# Patient Record
Sex: FEMALE | Race: WHITE | Employment: UNEMPLOYED | URBAN - NONMETROPOLITAN AREA
[De-identification: names, ages, dates, MRNs, and addresses within clinical notes are randomized per-mention and may not be internally consistent; named-entity substitution may affect disease eponyms.]

---

## 2018-02-08 ENCOUNTER — HOSPITAL ENCOUNTER (EMERGENCY)
Age: 43
Discharge: HOME | End: 2018-02-08
Attending: EMERGENCY MEDICINE
Payer: MEDICAID

## 2018-02-08 ENCOUNTER — OFFICE VISIT (OUTPATIENT)
Dept: INTERNAL MEDICINE CLINIC | Age: 43
End: 2018-02-08
Payer: MEDICAID

## 2018-02-08 VITALS
OXYGEN SATURATION: 100 % | BODY MASS INDEX: 19.99 KG/M2 | RESPIRATION RATE: 16 BRPM | HEART RATE: 76 BPM | DIASTOLIC BLOOD PRESSURE: 82 MMHG | WEIGHT: 135 LBS | SYSTOLIC BLOOD PRESSURE: 121 MMHG | HEIGHT: 69 IN | TEMPERATURE: 98.3 F

## 2018-02-08 VITALS
SYSTOLIC BLOOD PRESSURE: 90 MMHG | HEART RATE: 68 BPM | DIASTOLIC BLOOD PRESSURE: 70 MMHG | HEIGHT: 69 IN | BODY MASS INDEX: 19.85 KG/M2 | WEIGHT: 134 LBS

## 2018-02-08 DIAGNOSIS — F45.1 SOMATIC SYMPTOM DISORDER, SEVERE: Primary | ICD-10-CM

## 2018-02-08 DIAGNOSIS — G89.4 CHRONIC PAIN SYNDROME: Primary | ICD-10-CM

## 2018-02-08 DIAGNOSIS — M54.6 ACUTE MIDLINE THORACIC BACK PAIN: ICD-10-CM

## 2018-02-08 PROCEDURE — 99205 OFFICE O/P NEW HI 60 MIN: CPT | Performed by: INTERNAL MEDICINE

## 2018-02-08 PROCEDURE — 99282 EMERGENCY DEPT VISIT SF MDM: CPT

## 2018-02-08 PROCEDURE — G8484 FLU IMMUNIZE NO ADMIN: HCPCS | Performed by: INTERNAL MEDICINE

## 2018-02-08 PROCEDURE — G8420 CALC BMI NORM PARAMETERS: HCPCS | Performed by: INTERNAL MEDICINE

## 2018-02-08 PROCEDURE — 1036F TOBACCO NON-USER: CPT | Performed by: INTERNAL MEDICINE

## 2018-02-08 PROCEDURE — G8427 DOCREV CUR MEDS BY ELIG CLIN: HCPCS | Performed by: INTERNAL MEDICINE

## 2018-02-08 RX ORDER — NORTRIPTYLINE HYDROCHLORIDE 10 MG/1
10 CAPSULE ORAL NIGHTLY
COMMUNITY
End: 2018-09-18 | Stop reason: ALTCHOICE

## 2018-02-08 RX ORDER — KETOROLAC TROMETHAMINE 30 MG/ML
30 INJECTION, SOLUTION INTRAMUSCULAR; INTRAVENOUS ONCE
Status: DISCONTINUED | OUTPATIENT
Start: 2018-02-08 | End: 2018-02-08 | Stop reason: HOSPADM

## 2018-02-08 RX ORDER — ENZYMES,DIGESTIVE
1 CAPSULE ORAL
COMMUNITY
End: 2018-09-18

## 2018-02-08 RX ORDER — MAGNESIUM 30 MG
60 TABLET ORAL NIGHTLY PRN
COMMUNITY
End: 2018-09-27

## 2018-02-08 RX ORDER — ACETAMINOPHEN 160 MG
TABLET,DISINTEGRATING ORAL DAILY PRN
COMMUNITY
End: 2018-09-27

## 2018-02-08 RX ORDER — MELOXICAM 15 MG/1
15 TABLET ORAL DAILY
COMMUNITY
End: 2018-09-18

## 2018-02-08 RX ORDER — VITAMIN E 268 MG
400 CAPSULE ORAL DAILY
COMMUNITY
End: 2018-09-27

## 2018-02-08 RX ORDER — MULTIVIT WITH MINERALS/LUTEIN
1000 TABLET ORAL DAILY
COMMUNITY
End: 2018-09-18

## 2018-02-08 RX ORDER — TRAMADOL HYDROCHLORIDE 50 MG/1
100 TABLET ORAL ONCE
Status: DISCONTINUED | OUTPATIENT
Start: 2018-02-08 | End: 2018-02-08 | Stop reason: HOSPADM

## 2018-02-08 RX ORDER — TRAMADOL HYDROCHLORIDE 50 MG/1
50 TABLET ORAL EVERY 6 HOURS PRN
Qty: 15 TABLET | Refills: 0 | Status: SHIPPED | OUTPATIENT
Start: 2018-02-08 | End: 2018-02-10

## 2018-02-08 RX ORDER — PANCREATIN/LIPASE/PROTEASE/AMY 325 MG
3 TABLET ORAL
COMMUNITY
End: 2018-09-27

## 2018-02-08 ASSESSMENT — PAIN DESCRIPTION - LOCATION: LOCATION: BACK

## 2018-02-08 ASSESSMENT — PATIENT HEALTH QUESTIONNAIRE - PHQ9
SUM OF ALL RESPONSES TO PHQ QUESTIONS 1-9: 0
2. FEELING DOWN, DEPRESSED OR HOPELESS: 0
SUM OF ALL RESPONSES TO PHQ9 QUESTIONS 1 & 2: 0
1. LITTLE INTEREST OR PLEASURE IN DOING THINGS: 0

## 2018-02-08 ASSESSMENT — PAIN DESCRIPTION - ORIENTATION: ORIENTATION: LOWER;MID

## 2018-02-08 ASSESSMENT — PAIN DESCRIPTION - PAIN TYPE: TYPE: ACUTE PAIN

## 2018-02-08 ASSESSMENT — PAIN SCALES - GENERAL: PAINLEVEL_OUTOF10: 7

## 2018-02-08 NOTE — ED PROVIDER NOTES
eMERGENCY dEPARTMENT eNCOUnter      [facility]    Chief Complaint   Patient presents with    Back Pain       HPI    Jacquelin Iglesias is a 38 y.o. female who presents Above-noted complaint. Patient's been having worsening back pain for over 2 years. She actually called the department here asking for a MRI. Obviously cannot give her advice over the phone and she showed up for evaluation. Patient states that she's had chronic back pain for over 2 years. She's been seen by neurologist and rheumatologist and her primary care and she feels like she is getting yanked around. She's been prescribed Mobic and states that she's having problems with that. He's had GI problems and then follow-up with GI. She is frustrated and wants a scan of her body essentially     PAST MEDICAL HISTORY    Past Medical History:   Diagnosis Date    Back pain     chronic for approximately 2 years    Lyme disease 1998       SURGICAL HISTORY    History reviewed. No pertinent surgical history. CURRENT MEDICATIONS    Current Outpatient Rx   Medication Sig Dispense Refill    meloxicam (MOBIC) 15 MG tablet Take 15 mg by mouth daily      traMADol (ULTRAM) 50 MG tablet Take 1 tablet by mouth every 6 hours as needed for Pain for up to 10 doses. 15 tablet 0    nortriptyline (PAMELOR) 10 MG capsule Take 10 mg by mouth nightly         ALLERGIES    Allergies   Allergen Reactions    Pcn [Penicillins] Shortness Of Breath and Rash       FAMILY HISTORY    History reviewed. No pertinent family history.     SOCIAL HISTORY    Social History     Social History    Marital status:      Spouse name: N/A    Number of children: N/A    Years of education: N/A     Social History Main Topics    Smoking status: Never Smoker    Smokeless tobacco: Never Used    Alcohol use No    Drug use: No    Sexual activity: Not Asked     Other Topics Concern    None     Social History Narrative    None       REVIEW OF SYSTEMS    Positive for chronic

## 2018-02-08 NOTE — ED NOTES
Pt refused toradol or tramadol at this time. Time spent with pt explaining importance of following up with physician in the morning. Expressed extreme dismay with care and feeling like no one is listening. Multiple tests have been ran per pt by neurologist, GI and rheumatologist without findings. Possible consult future planned with oncology per other physicians advice. Educated pt on nortryptiline as she has not taken due to she read it is an antidepressent and she is not depressed. Educated her that it is also used for chronic pain and may help her to sleep. Educated on mobic and GI symptoms.   Pt verbalized understanding on med education     Mirtha Vera, RN  02/08/18 1717

## 2018-02-08 NOTE — PROGRESS NOTES
positive for  - fatigue, malaise and sleep disturbance  Psychological ROS: positive for - anxiety  Hematological and Lymphatic ROS: negative  Respiratory ROS: no cough, shortness of breath, or wheezing  Cardiovascular ROS: no chest pain or dyspnea on exertion  Gastrointestinal ROS: positive for - abdominal pain, change in bowel habits, constipation and diarrhea  Genito-Urinary ROS: no dysuria, trouble voiding, or hematuria  Musculoskeletal ROS: positive for - pain in mid back, radiates around his posterior right ribs also goes up to her shoulders  Neurological ROS: positive for - numbness/tingling  Dermatological ROS: negative    Blood pressure 90/70, pulse 68, height 5' 9.02\" (1.753 m), weight 134 lb (60.8 kg), last menstrual period 02/01/2018. Physical Examination: General appearance - alert, well appearing, and in no distress  Mental status - alert, oriented to person, place, and time  Neck - supple, no significant adenopathy  Chest - clear to auscultation, no wheezes, rales or rhonchi, symmetric air entry  Heart - normal rate, regular rhythm, normal S1, S2, no murmurs, rubs, clicks or gallops  Abdomen - soft, nontender, nondistended, no masses or organomegaly  Neurological - alert, oriented, normal speech, no focal findings or movement disorder noted  Musculoskeletal - no joint tenderness, deformity or swelling  Extremities - peripheral pulses normal, no pedal edema, no clubbing or cyanosis  Skin - normal coloration and turgor, no rashes, no suspicious skin lesions noted         1. Somatic symptom disorder, severe     2.  Acute midline thoracic back pain  XR RIBS BILATERAL (3 VIEWS)    XR THORACIC SPINE (2 VIEWS)       Orders Placed This Encounter   Procedures    XR RIBS BILATERAL (3 VIEWS)     Standing Status:   Future     Standing Expiration Date:   2/8/2019     Order Specific Question:   Reason for exam:     Answer:   back pain    XR THORACIC SPINE (2 VIEWS)     Standing Status:   Future     Standing

## 2018-02-08 NOTE — ED NOTES
Discharge teaching and instructions for condition explained to patient. AVS reviewed. Printed prescriptions given to patient. Patient voiced understanding regarding prescriptions, follow up appointments and care of self at home. Pt discharged to home in stable condition per self with spouse at side. x2 doses of tramadol given to pt educated on taking one at a time every 4-6 hours per physicians orders.         Perry Tee RN  02/08/18 9146

## 2018-02-12 ENCOUNTER — OFFICE VISIT (OUTPATIENT)
Dept: INTERNAL MEDICINE CLINIC | Age: 43
End: 2018-02-12
Payer: MEDICAID

## 2018-02-12 VITALS
DIASTOLIC BLOOD PRESSURE: 78 MMHG | HEART RATE: 60 BPM | HEIGHT: 69 IN | BODY MASS INDEX: 19.99 KG/M2 | WEIGHT: 135 LBS | SYSTOLIC BLOOD PRESSURE: 118 MMHG

## 2018-02-12 DIAGNOSIS — F45.1 SOMATIC SYMPTOM DISORDER: Primary | ICD-10-CM

## 2018-02-12 DIAGNOSIS — M54.6 CHRONIC BILATERAL THORACIC BACK PAIN: ICD-10-CM

## 2018-02-12 DIAGNOSIS — K90.41 GLUTEN INTOLERANCE: ICD-10-CM

## 2018-02-12 DIAGNOSIS — G89.29 CHRONIC BILATERAL THORACIC BACK PAIN: ICD-10-CM

## 2018-02-12 PROCEDURE — G8427 DOCREV CUR MEDS BY ELIG CLIN: HCPCS | Performed by: INTERNAL MEDICINE

## 2018-02-12 PROCEDURE — 1036F TOBACCO NON-USER: CPT | Performed by: INTERNAL MEDICINE

## 2018-02-12 PROCEDURE — G8420 CALC BMI NORM PARAMETERS: HCPCS | Performed by: INTERNAL MEDICINE

## 2018-02-12 PROCEDURE — 99213 OFFICE O/P EST LOW 20 MIN: CPT | Performed by: INTERNAL MEDICINE

## 2018-02-12 PROCEDURE — G8484 FLU IMMUNIZE NO ADMIN: HCPCS | Performed by: INTERNAL MEDICINE

## 2018-02-12 NOTE — PATIENT INSTRUCTIONS
Patient Education        Fibromyalgia: Care Instructions  Your Care Instructions    Fibromyalgia is a painful condition that is not completely understood by medical experts. The cause of fibromyalgia is not known. It can make you feel tired and ache all over. It causes tender spots at specific points of the body that hurt only when you press on them. You may have trouble sleeping, as well as other symptoms. These problems can upset your work and home life. Symptoms tend to come and go, although they may never go away completely. Fibromyalgia does not harm your muscles, joints, or organs. Follow-up care is a key part of your treatment and safety. Be sure to make and go to all appointments, and call your doctor if you are having problems. It's also a good idea to know your test results and keep a list of the medicines you take. How can you care for yourself at home? · Exercise often. Walk, swim, or bike to help with pain and sleep problems and to make you feel better. · Try to get a good night's sleep. Go to bed and get up at the same time each day, whether you feel rested or not. Make sure you have a good mattress and pillow. · Reduce stress. Avoid things that cause you stress, if you can. If not, work at making them less stressful. Learn to use biofeedback, guided imagery, meditation, or other methods to relax. · Make healthy changes. Eat a balanced diet, quit smoking, and limit alcohol and caffeine. · Use a heating pad set on low or take warm baths or showers for pain. Using cold packs for up to 20 minutes at a time can also relieve pain. Put a thin cloth between the cold pack and your skin. A gentle massage might help too. · Be safe with medicines. Take your medicines exactly as prescribed. Call your doctor if you think you are having a problem with your medicine. Your doctor may talk to you about taking antidepressant medicines.  These medicines may improve sleep, relieve pain, and in some cases treat depression. · Learn about fibromyalgia. This makes coping easier. Then, take an active role in your treatment. · Think about joining a support group with others who have fibromyalgia to learn more and get support. When should you call for help? Watch closely for changes in your health, and be sure to contact your doctor if:  ? · You feel sad, helpless, or hopeless; lose interest in things you used to enjoy; or have other symptoms of depression. ? · Your fibromyalgia symptoms get worse. Where can you learn more? Go to https://AdmetricpeFrienditePlus.Moki - formerly MokiMobility. org and sign in to your Race Yourself account. Enter V003 in the Nixle box to learn more about \"Fibromyalgia: Care Instructions. \"     If you do not have an account, please click on the \"Sign Up Now\" link. Current as of: October 14, 2016  Content Version: 11.5  © 4852-7442 Healthwise, Incorporated. Care instructions adapted under license by [facility] ([facility]). If you have questions about a medical condition or this instruction, always ask your healthcare professional. [address] any warranty or liability for your use of this information.

## 2018-02-13 NOTE — PROGRESS NOTES
[facility] PROFESSIONAL SERVS  PHYSICIANS [place] [address] [place] [address] [place], Vania Knight  Dept: [phone]  Dept Fax: [phone]      Chief Complaint   Patient presents with    Other     review x-ray       Patient presents for evaluation of multiple complaints. I saw her a few days ago. This patient is followed regularly by Dr. Fritz Huff. Patient is being worked up for multiple issues. She says her main issue is back pain. She jumps from subject to subject. Cannot get her to concentrate on one. She says she has irritable bowel syndrome. She says her back hurts in the middle ,goes to the right. She seen multiple specialists including rheumatologist, neurologists. She has had multiple tests. She actually went to the emergency room  complaining of severe back pain last week. She says no one will take her seriously. She also complains of tingling in her arms and legs. She says if someone would just do a CT scan she would feel better. Also, she said she would like her pancreas checked out  Her uncle had severe pain and he ended up having cancer. She's given up going to the gym and working out because of her pain  I ordered thoracic spine films and rib series  There is no problem list on file for this patient.       Current Outpatient Prescriptions   Medication Sig Dispense Refill    meloxicam (MOBIC) 15 MG tablet Take 15 mg by mouth daily      nortriptyline (PAMELOR) 10 MG capsule Take 10 mg by mouth nightly      Misc Natural Products (CHLORELLA) 500 MG CAPS Take 2 capsules by mouth 2 times daily      BLACK CURRANT SEED OIL PO Take by mouth 3 tsp daily      Nutritional Supplements (ARTHRO-COMPLEX PO) Take by mouth daily      Cholecalciferol (VITAMIN D3) 2000 units CAPS Take by mouth daily      Cold Sore Products (COLD SORE EX) Take by mouth 1 tsp daily      Nutritional Supplements (NUTRITIONAL SUPPLEMENT PLUS PO) Take by mouth Ultimate Lectin Defense taking 2-3 times before meals      Digestive Enzymes (ENZYME DIGEST) CAPS Take 1 capsule by mouth 3 times daily (with meals)      APAP-Pamabrom-Pyrilamine (PMS-FORMULA PO) Take 2 capsules by mouth every morning      Nutritional Supplements (NUTRITIONAL SUPPLEMENT PLUS PO) Take by mouth as needed Formula 303 (Relaxant)      Pancreatin 325 MG TABS Take 3 tablets by mouth 4 times daily (after meals and at bedtime)      Calcium-Magnesium (AMERICA-MAG) 500-250 MG TABS Take 2 tablets by mouth every morning      magnesium 30 MG tablet Take 60 mg by mouth nightly      Potassium 75 MG TABS Take 2 tablets by mouth daily      vitamin E 400 UNIT capsule Take 400 Units by mouth daily      Ascorbic Acid (VITAMIN C) 1000 MG tablet Take 1,000 mg by mouth daily      Nutritional Supplements (NUTRITIONAL SUPPLEMENT PO) Take by mouth Colloidal Silver 2-4 oz 4 times daily      OLIVE LEAF EXTRACT PO Take 1 tablet by mouth daily      LYSINE PO Take by mouth as needed       No current facility-administered medications for this visit. Review of Systems - As above  Blood pressure 118/78, pulse 60, height 5' 9.02\" (1.753 m), weight 135 lb (61.2 kg), last menstrual period 02/01/2018. Physical Examination: Not repeated      1. Somatic symptom disorder     2. Chronic bilateral thoracic back pain  External Referral To Physical Therapy   3. Gluten intolerance         Orders Placed This Encounter   Procedures    External Referral To Physical Therapy     Referral Priority:   Routine     Referral Type:   Eval and Treat     Requested Specialty:   Physical Therapy     Number of Visits Requested:   1     Back films shows mild degenerative changes, rib series normal  Patient has classic somatic symptom disorder. I did approach this with her. She was reluctant to accept a psychiatric diagnosis. I did explain to her that it's not that she's not having pain, but we can't find a physiologic explanation. I think she would benefit from psychiatric evaluation.   She says

## 2018-02-14 DIAGNOSIS — M54.6 ACUTE MIDLINE THORACIC BACK PAIN: ICD-10-CM

## 2018-03-08 ENCOUNTER — NURSE TRIAGE (OUTPATIENT)
Dept: ADMINISTRATIVE | Age: 43
End: 2018-03-08

## 2018-04-05 DIAGNOSIS — M54.6 ACUTE MIDLINE THORACIC BACK PAIN: ICD-10-CM

## 2018-05-03 ENCOUNTER — HOSPITAL ENCOUNTER (OUTPATIENT)
Dept: CT IMAGING | Age: 43
Discharge: HOME | End: 2018-05-03
Payer: MEDICAID

## 2018-05-03 DIAGNOSIS — R10.9 ABDOMINAL PAIN, UNSPECIFIED ABDOMINAL LOCATION: ICD-10-CM

## 2018-05-03 PROCEDURE — 6360000004 HC RX CONTRAST MEDICATION: Performed by: CHIROPRACTOR

## 2018-05-03 PROCEDURE — 74177 CT ABD & PELVIS W/CONTRAST: CPT

## 2018-05-03 RX ADMIN — IOHEXOL 50 ML: 240 INJECTION, SOLUTION INTRATHECAL; INTRAVASCULAR; INTRAVENOUS; ORAL at 07:10

## 2018-05-03 RX ADMIN — IOPAMIDOL 100 ML: 755 INJECTION, SOLUTION INTRAVENOUS at 08:25

## 2018-09-18 ENCOUNTER — OFFICE VISIT (OUTPATIENT)
Dept: FAMILY MEDICINE CLINIC | Age: 43
End: 2018-09-18
Payer: MEDICAID

## 2018-09-18 ENCOUNTER — HOSPITAL ENCOUNTER (OUTPATIENT)
Age: 43
Discharge: HOME | End: 2018-09-18
Payer: MEDICAID

## 2018-09-18 VITALS
OXYGEN SATURATION: 99 % | DIASTOLIC BLOOD PRESSURE: 70 MMHG | WEIGHT: 132 LBS | HEIGHT: 70 IN | BODY MASS INDEX: 18.9 KG/M2 | TEMPERATURE: 97.7 F | RESPIRATION RATE: 10 BRPM | SYSTOLIC BLOOD PRESSURE: 102 MMHG | HEART RATE: 62 BPM

## 2018-09-18 DIAGNOSIS — R06.02 SHORTNESS OF BREATH: ICD-10-CM

## 2018-09-18 DIAGNOSIS — R53.83 TIRED: ICD-10-CM

## 2018-09-18 DIAGNOSIS — G47.9 SLEEP DIFFICULTIES: ICD-10-CM

## 2018-09-18 DIAGNOSIS — R35.1 NOCTURIA: ICD-10-CM

## 2018-09-18 DIAGNOSIS — R53.83 TIRED: Primary | ICD-10-CM

## 2018-09-18 DIAGNOSIS — F41.9 ANXIETY: ICD-10-CM

## 2018-09-18 DIAGNOSIS — K59.01 SLOW TRANSIT CONSTIPATION: ICD-10-CM

## 2018-09-18 DIAGNOSIS — R10.13 EPIGASTRIC PAIN: ICD-10-CM

## 2018-09-18 DIAGNOSIS — R42 DIZZINESS: ICD-10-CM

## 2018-09-18 DIAGNOSIS — R52 ACHES: ICD-10-CM

## 2018-09-18 DIAGNOSIS — R41.3 MEMORY DIFFICULTIES: ICD-10-CM

## 2018-09-18 LAB
ALBUMIN SERPL-MCNC: 4.3 G/DL (ref 3.5–5.1)
ALP BLD-CCNC: 56 U/L (ref 38–126)
ALT SERPL-CCNC: 18 U/L (ref 11–66)
AMYLASE: 80 U/L (ref 20–104)
ANION GAP SERPL CALCULATED.3IONS-SCNC: 13 MEQ/L (ref 8–16)
AST SERPL-CCNC: 19 U/L (ref 5–40)
AVERAGE GLUCOSE: 96 MG/DL (ref 70–126)
BASOPHILS # BLD: 0.9 %
BASOPHILS ABSOLUTE: 0 THOU/MM3 (ref 0–0.1)
BILIRUB SERPL-MCNC: 0.3 MG/DL (ref 0.3–1.2)
BILIRUBIN DIRECT: < 0.2 MG/DL (ref 0–0.3)
BUN BLDV-MCNC: 8 MG/DL (ref 7–22)
CALCIUM SERPL-MCNC: 9 MG/DL (ref 8.5–10.5)
CHLORIDE BLD-SCNC: 101 MEQ/L (ref 98–111)
CHOLESTEROL, TOTAL: 184 MG/DL (ref 100–199)
CO2: 26 MEQ/L (ref 23–33)
CREAT SERPL-MCNC: 0.6 MG/DL (ref 0.4–1.2)
EOSINOPHIL # BLD: 1.8 %
EOSINOPHILS ABSOLUTE: 0.1 THOU/MM3 (ref 0–0.4)
ERYTHROCYTE [DISTWIDTH] IN BLOOD BY AUTOMATED COUNT: 12 % (ref 11.5–14.5)
ERYTHROCYTE [DISTWIDTH] IN BLOOD BY AUTOMATED COUNT: 43.3 FL (ref 35–45)
ESTRADIOL LEVEL: 134.9 PG/ML
FOLLICLE STIMULATING HORMONE: 6.1 MIU/ML (ref 0.6–16.9)
GFR SERPL CREATININE-BSD FRML MDRD: > 90 ML/MIN/1.73M2
GLUCOSE BLD-MCNC: 90 MG/DL (ref 70–108)
HBA1C MFR BLD: 5.2 % (ref 4.4–6.4)
HCT VFR BLD CALC: 38.1 % (ref 37–47)
HDLC SERPL-MCNC: 74 MG/DL
HEMOGLOBIN: 12.9 GM/DL (ref 12–16)
IGE: 22 IU/ML
IMMATURE GRANS (ABS): 0.01 THOU/MM3 (ref 0–0.07)
IMMATURE GRANULOCYTES: 0.2 %
LDL CHOLESTEROL CALCULATED: 90 MG/DL
LIPASE: 44.7 U/L (ref 5.6–51.3)
LUTEINIZING HORMONE: 4.9 MIU/ML (ref 0.6–43.9)
LYMPHOCYTES # BLD: 32.4 %
LYMPHOCYTES ABSOLUTE: 1.5 THOU/MM3 (ref 1–4.8)
MAGNESIUM: 2.1 MG/DL (ref 1.6–2.4)
MCH RBC QN AUTO: 32.7 PG (ref 26–33)
MCHC RBC AUTO-ENTMCNC: 33.9 GM/DL (ref 32.2–35.5)
MCV RBC AUTO: 96.7 FL (ref 81–99)
MONOCYTES # BLD: 6.3 %
MONOCYTES ABSOLUTE: 0.3 THOU/MM3 (ref 0.4–1.3)
NUCLEATED RED BLOOD CELLS: 0 /100 WBC
PLATELET # BLD: 151 THOU/MM3 (ref 130–400)
PMV BLD AUTO: 12.1 FL (ref 9.4–12.4)
POTASSIUM SERPL-SCNC: 3.7 MEQ/L (ref 3.5–5.2)
PROGESTERONE LEVEL: < 0.05 NG/ML
PTH INTACT: 27.6 PG/ML (ref 15–65)
RBC # BLD: 3.94 MILL/MM3 (ref 4.2–5.4)
RHEUMATOID FACTOR: < 10 IU/ML (ref 0–13)
SEDIMENTATION RATE, ERYTHROCYTE: 2 MM/HR (ref 0–20)
SEG NEUTROPHILS: 58.4 %
SEGMENTED NEUTROPHILS ABSOLUTE COUNT: 2.6 THOU/MM3 (ref 1.8–7.7)
SODIUM BLD-SCNC: 140 MEQ/L (ref 135–145)
T3 TOTAL: 84 NG/DL (ref 72–181)
THYROXINE (T4): 6.1 UG/DL (ref 4.5–12)
TOTAL PROTEIN: 6.8 G/DL (ref 6.1–8)
TRIGL SERPL-MCNC: 102 MG/DL (ref 0–199)
TSH SERPL DL<=0.05 MIU/L-ACNC: 1.89 UIU/ML (ref 0.4–4.2)
URIC ACID: 2.4 MG/DL (ref 2.4–5.7)
VITAMIN D 25-HYDROXY: 84 NG/ML (ref 30–100)
WBC # BLD: 4.5 THOU/MM3 (ref 4.8–10.8)

## 2018-09-18 PROCEDURE — 83001 ASSAY OF GONADOTROPIN (FSH): CPT

## 2018-09-18 PROCEDURE — 36415 COLL VENOUS BLD VENIPUNCTURE: CPT

## 2018-09-18 PROCEDURE — 86141 C-REACTIVE PROTEIN HS: CPT

## 2018-09-18 PROCEDURE — 83735 ASSAY OF MAGNESIUM: CPT

## 2018-09-18 PROCEDURE — 86038 ANTINUCLEAR ANTIBODIES: CPT

## 2018-09-18 PROCEDURE — 84144 ASSAY OF PROGESTERONE: CPT

## 2018-09-18 PROCEDURE — 84270 ASSAY OF SEX HORMONE GLOBUL: CPT

## 2018-09-18 PROCEDURE — 83002 ASSAY OF GONADOTROPIN (LH): CPT

## 2018-09-18 PROCEDURE — 86376 MICROSOMAL ANTIBODY EACH: CPT

## 2018-09-18 PROCEDURE — 86430 RHEUMATOID FACTOR TEST QUAL: CPT

## 2018-09-18 PROCEDURE — 84436 ASSAY OF TOTAL THYROXINE: CPT

## 2018-09-18 PROCEDURE — 82248 BILIRUBIN DIRECT: CPT

## 2018-09-18 PROCEDURE — 80053 COMPREHEN METABOLIC PANEL: CPT

## 2018-09-18 PROCEDURE — 82627 DEHYDROEPIANDROSTERONE: CPT

## 2018-09-18 PROCEDURE — G8420 CALC BMI NORM PARAMETERS: HCPCS | Performed by: FAMILY MEDICINE

## 2018-09-18 PROCEDURE — 84403 ASSAY OF TOTAL TESTOSTERONE: CPT

## 2018-09-18 PROCEDURE — 82626 DEHYDROEPIANDROSTERONE: CPT

## 2018-09-18 PROCEDURE — 82150 ASSAY OF AMYLASE: CPT

## 2018-09-18 PROCEDURE — 99204 OFFICE O/P NEW MOD 45 MIN: CPT | Performed by: FAMILY MEDICINE

## 2018-09-18 PROCEDURE — 85651 RBC SED RATE NONAUTOMATED: CPT

## 2018-09-18 PROCEDURE — 84443 ASSAY THYROID STIM HORMONE: CPT

## 2018-09-18 PROCEDURE — 83690 ASSAY OF LIPASE: CPT

## 2018-09-18 PROCEDURE — 85025 COMPLETE CBC W/AUTO DIFF WBC: CPT

## 2018-09-18 PROCEDURE — 84480 ASSAY TRIIODOTHYRONINE (T3): CPT

## 2018-09-18 PROCEDURE — 80061 LIPID PANEL: CPT

## 2018-09-18 PROCEDURE — 82306 VITAMIN D 25 HYDROXY: CPT

## 2018-09-18 PROCEDURE — 82785 ASSAY OF IGE: CPT

## 2018-09-18 PROCEDURE — 84550 ASSAY OF BLOOD/URIC ACID: CPT

## 2018-09-18 PROCEDURE — 83970 ASSAY OF PARATHORMONE: CPT

## 2018-09-18 PROCEDURE — 83516 IMMUNOASSAY NONANTIBODY: CPT

## 2018-09-18 PROCEDURE — 82670 ASSAY OF TOTAL ESTRADIOL: CPT

## 2018-09-18 PROCEDURE — 1036F TOBACCO NON-USER: CPT | Performed by: FAMILY MEDICINE

## 2018-09-18 PROCEDURE — G8427 DOCREV CUR MEDS BY ELIG CLIN: HCPCS | Performed by: FAMILY MEDICINE

## 2018-09-18 PROCEDURE — 83090 ASSAY OF HOMOCYSTEINE: CPT

## 2018-09-18 PROCEDURE — 83036 HEMOGLOBIN GLYCOSYLATED A1C: CPT

## 2018-09-18 ASSESSMENT — ENCOUNTER SYMPTOMS
CONSTIPATION: 1
CHEST TIGHTNESS: 1
BACK PAIN: 1
ABDOMINAL DISTENTION: 1
NAUSEA: 1
SHORTNESS OF BREATH: 1
ABDOMINAL PAIN: 1

## 2018-09-18 NOTE — PATIENT INSTRUCTIONS
1. You may receive a survey about your visit with us today. The feedback from our patients helps us identify what is working well and where the service to all patients can be enhanced. Thank you! 2. Please bring in ALL medications BOTTLES, including inhalers, herbal supplements, over the counter, prescribed & non-prescribed medicine. The office would like actual medication bottles and a list.  3. Please note our OFFICE POLICIES:  a. Prior to getting your labs drawn, please check with your insurance company for benefits and eligibility of lab services. Often, insurance companies cover certain tests for preventative visits only. It is patient's responsibility to see what is covered. b. We are unable to change a diagnosis after the test has been performed. c. Lab orders will not be re-printed. Please hold onto your original lab orders and take them to your lab to be completed. d. If you no show your schedule appointment three times, you be dismissed from this practice. e. Sara Vilchis must be completed 24 hours prior to your schedule appointment. 4. If the list below has been completed, PLEASE FAX RECORDS TO OUR OFFICE @ [phone]. Once the records have been received we will update your records at our office. Health Maintenance Due   Topic Date Due    HIV screen  11/08/1988    DTaP/Tdap/Td vaccine (1 - Tdap) 11/08/1992    Cervical cancer screen  11/08/1994    Lipid screen  11/08/2013    Flu vaccine (1) 09/01/2018       Schedule your 2018 flu vaccine with Dr. Millie Ozuna call [phone]!   [facility], for anyone 9 years or older   [address]   Every Monday   8:00am-11:00am and 1:00pm-3:00pm

## 2018-09-18 NOTE — PROGRESS NOTES
Pain (calf pain all the time, looses voice); Anxiety; Nephropathy; Muscle Pain; Dizziness; and Migraine. Ayaka Kaminski  presents to the [facility] today for;   Chief Complaint   Patient presents with   [facility] New Doctor     Eddie cheng- brought past labs    Other     HX OF lyme disease 1998 while in [place], stress, sees chiropractor- helping with cebo, had wt loss, trouble eating and finding foods    Thyroid Problem     hashimoto per chiropactor, lead issues    Fatigue     dizziness, sleep issues,headaches, all worse during periods, feels like she will pass out    Muscle Pain     calf pain all the time, looses voice    Anxiety    Nephropathy    Muscle Pain    Dizziness    Migraine   , ,  abnormal labs follow up and these conditions as she  Is looking today for:     1. Tired    2. Aches    3. Epigastric pain    4. Dizziness    5. Sleep difficulties    6. Memory difficulties    7. Anxiety    8. Shortness of breath    9. Slow transit constipation    10. Nocturia          Review of Systems   Constitutional: Positive for chills, diaphoresis, fatigue and unexpected weight change. HENT: Positive for dental problem and postnasal drip. Eyes: Positive for visual disturbance. Respiratory: Positive for chest tightness and shortness of breath. Cardiovascular: Positive for chest pain and palpitations. Gastrointestinal: Positive for abdominal distention, abdominal pain, constipation and nausea. Endocrine: Positive for cold intolerance and heat intolerance. Genitourinary: Positive for frequency and menstrual problem. Musculoskeletal: Positive for arthralgias, back pain, gait problem, joint swelling, myalgias, neck pain and neck stiffness. Allergic/Immunologic: Positive for food allergies. Neurological: Positive for dizziness, speech difficulty, weakness, light-headedness, numbness and headaches. Hematological: Positive for adenopathy.    Psychiatric/Behavioral: Positive for [Penicillins] Shortness Of Breath and Rash       Past Medical History:   Diagnosis Date    Back pain     chronic for approximately 2 years    Lyme disease 1998       Past Surgical History:   Procedure Laterality Date    CHOLECYSTECTOMY  2008       Social History     Social History    Marital status:      Spouse name: N/A    Number of children: N/A    Years of education: N/A     Occupational History    Not on file. Social History Main Topics    Smoking status: Never Smoker    Smokeless tobacco: Never Used    Alcohol use No    Drug use: No    Sexual activity: Not on file     Other Topics Concern    Not on file     Social History Narrative    No narrative on file        Family History   Problem Relation Age of Onset    Heart Attack Father     Other Father         auto immune       Vitals:    09/18/18 0905   BP: 102/70   Site: Left Upper Arm   Position: Sitting   Cuff Size: Medium Adult   Pulse: 62   Resp: 10   Temp: 97.7 °F (36.5 °C)   TempSrc: Oral   SpO2: 99%   Weight: 132 lb (59.9 kg)   Height: 5' 9.5\" (1.765 m)     Estimated body mass index is 19.21 kg/m² as calculated from the following:    Height as of this encounter: 5' 9.5\" (1.765 m). Weight as of this encounter: 132 lb (59.9 kg). Physical Exam   Constitutional: She is oriented to person, place, and time. She appears well-developed and well-nourished. HENT:   Head: Normocephalic. Pulmonary/Chest: Effort normal.   Neurological: She is alert and oriented to person, place, and time. Psychiatric: She has a normal mood and affect. Thought content normal.   Nursing note and vitals reviewed. ASSESSMENT/PLAN:      No Follow-up on file.   Laboratory Data:   Lab results were searched in Care Everywhere and/or those brought by the pateint were reviewed today with Clifford Mitchell and she has a copy of their most recent labs to take home with them as noted below;       Imaging Data:   Imaging Data:       Assessment & Plan: on this Visit:                                   1.  Intensity of Service; Uncontrolled items at this visit; Chief Complaint   Patient presents with   Sarai Guillaume New Doctor     Eddie cheng- brought past labs    Other     HX OF lyme disease 1998 while in [place], stress, sees chiropractor- helping with cebo, had wt loss, trouble eating and finding foods    Thyroid Problem     hashimoto per chiropactor, lead issues    Fatigue     dizziness, sleep issues,headaches, all worse during periods, feels like she will pass out    Muscle Pain     calf pain all the time, looses voice    Anxiety    Nephropathy    Muscle Pain    Dizziness    Migraine   ; Improved items reviewed at this visit; Stable items noted at this visit;  2. Patient's foods and drinks were reviewed with the patient,       - Bri Deluna will bring food+drink symptom log to next visit for inclusion in their record      - 75 better food list reviewed & given to patient with the omega 6 food list to avoid         - Gluten in corn and oats abstracts sheet reviewed and given to the patient today   3. Greater than 45 minutes were spent face to face on this visit of which >50% was for counseling and coordination of care. Patient's foods, drinks and supplements were reviewed with the patient,   - they will bring a food drink symptom log to future visits for inclusion in their record    - 75 better food list reviewed & given to patient along with the omega 6 food list to avoid      - Gluten in corn and oats abstracts sheet reviewed and given to the patient today    - 51 Foods containing Latex-like proteins was reviewed and copy given to the patient     - Nutrient Supplements list provided and copy given to the patient     - Beryllium. [employer] web site offered to patient to review at their convenience by staff with login information    - www.Zazzyation. org site reviewed with the patient so they can identify better ripening. Forced ripening by high ethylene concentrations, plants produce allergenic wound-repair proteins, which are similar to wound-repair proteins made during the tapping of rubber trees. Sensitive individuals who ingest the fruit get a higher dose and worse reaction. Some people may even first become sensitized to latex through fruit. Can food processing increase the concentrations of allergenic proteins? Latex-sensitized children (and adults) in [place] often experience allergic reactions after eating bananas ripened artificially with ethylene. In the [place], food distribution centers treat unripe bananas and other produce with ethylene to ripen; not commonly done in [facility] since fruit is tree-ripened there. Does treatment of food with ethylene induce banana proteins that cross-react with latex? (Rangel et al.    References:   Latex in Foods Allergy, http://ehp.niehs.nih.gov/members/2003/5811/5811.html    Search web for \" Whats in Season \" for where you live or are at the time you food shop  www.nutritioncouncil.org/pdf/healthy/SeasonalProduce. pdf ,   Management of Latex, http://medicalcenter. [facility].edu/  search for latex  An  electronic signature was used to authenticate this note.     --Charis Elliott MD on 9/18/2018 at 10:09 AM

## 2018-09-19 ENCOUNTER — PATIENT MESSAGE (OUTPATIENT)
Dept: FAMILY MEDICINE CLINIC | Age: 43
End: 2018-09-19

## 2018-09-19 LAB
C-REACTIVE PROTEIN, HIGH SENSITIVITY: 0.2 MG/L
HOMOCYSTEINE, TOTAL: 5 UMOL/L
THYROID PEROXIDASE ANTIBODY: 402.4 IU/ML (ref 0–9)

## 2018-09-20 ENCOUNTER — TELEPHONE (OUTPATIENT)
Dept: FAMILY MEDICINE CLINIC | Age: 43
End: 2018-09-20

## 2018-09-20 LAB
ANA SCREEN: NORMAL
DHEAS (DHEA SULFATE): 54 UG/DL (ref 32–240)
GLIADIN PEPTIDE IGG, IGA: NORMAL

## 2018-09-22 LAB
DHEA UNCONJUGATED: 1.23 NG/ML (ref 0.63–4.7)
TESTOSTERONE, FREE W SHGB, FEMALES/CHILDREN: NORMAL

## 2018-09-27 ENCOUNTER — OFFICE VISIT (OUTPATIENT)
Dept: FAMILY MEDICINE CLINIC | Age: 43
End: 2018-09-27
Payer: MEDICAID

## 2018-09-27 VITALS
BODY MASS INDEX: 18.04 KG/M2 | TEMPERATURE: 97.9 F | HEIGHT: 70 IN | DIASTOLIC BLOOD PRESSURE: 60 MMHG | WEIGHT: 126 LBS | SYSTOLIC BLOOD PRESSURE: 98 MMHG | RESPIRATION RATE: 12 BRPM

## 2018-09-27 DIAGNOSIS — R42 DIZZINESS: ICD-10-CM

## 2018-09-27 DIAGNOSIS — Z86.19 H/O LYME DISEASE: ICD-10-CM

## 2018-09-27 DIAGNOSIS — K59.01 SLOW TRANSIT CONSTIPATION: ICD-10-CM

## 2018-09-27 DIAGNOSIS — F41.9 ANXIETY: ICD-10-CM

## 2018-09-27 DIAGNOSIS — R06.02 SHORTNESS OF BREATH: ICD-10-CM

## 2018-09-27 DIAGNOSIS — R53.83 TIRED: ICD-10-CM

## 2018-09-27 DIAGNOSIS — R35.1 NOCTURIA: ICD-10-CM

## 2018-09-27 DIAGNOSIS — G47.9 SLEEP DIFFICULTIES: ICD-10-CM

## 2018-09-27 DIAGNOSIS — R52 ACHES: ICD-10-CM

## 2018-09-27 DIAGNOSIS — R41.3 MEMORY DIFFICULTIES: ICD-10-CM

## 2018-09-27 DIAGNOSIS — R10.13 EPIGASTRIC PAIN: ICD-10-CM

## 2018-09-27 PROCEDURE — G8419 CALC BMI OUT NRM PARAM NOF/U: HCPCS | Performed by: FAMILY MEDICINE

## 2018-09-27 PROCEDURE — G8427 DOCREV CUR MEDS BY ELIG CLIN: HCPCS | Performed by: FAMILY MEDICINE

## 2018-09-27 PROCEDURE — 1036F TOBACCO NON-USER: CPT | Performed by: FAMILY MEDICINE

## 2018-09-27 PROCEDURE — 99215 OFFICE O/P EST HI 40 MIN: CPT | Performed by: FAMILY MEDICINE

## 2018-09-27 RX ORDER — MELATONIN 3 MG
10 TABLET ORAL
COMMUNITY

## 2018-09-27 RX ORDER — CHLORAL HYDRATE 500 MG
2000 CAPSULE ORAL
COMMUNITY

## 2018-09-27 RX ORDER — BACLOFEN 20 MG
1 TABLET ORAL
COMMUNITY

## 2018-09-27 RX ORDER — CALCIUM CARBONATE 500(1250)
500 TABLET ORAL
COMMUNITY

## 2018-09-27 ASSESSMENT — ENCOUNTER SYMPTOMS
CONSTIPATION: 1
ABDOMINAL DISTENTION: 1
DIARRHEA: 1
ABDOMINAL PAIN: 1

## 2018-09-27 NOTE — PATIENT INSTRUCTIONS
1. You may receive a survey about your visit with us today. The feedback from our patients helps us identify what is working well and where the service to all patients can be enhanced. Thank you! 2. Please bring in ALL medications BOTTLES, including inhalers, herbal supplements, over the counter, prescribed & non-prescribed medicine. The office would like actual medication bottles and a list.  3. Please note our OFFICE POLICIES:  a. Prior to getting your labs drawn, please check with your insurance company for benefits and eligibility of lab services. Often, insurance companies cover certain tests for preventative visits only. It is patient's responsibility to see what is covered. b. We are unable to change a diagnosis after the test has been performed. c. Lab orders will not be re-printed. Please hold onto your original lab orders and take them to your lab to be completed. d. If you no show your schedule appointment three times, you be dismissed from this practice. e. Mauri Dick must be completed 24 hours prior to your schedule appointment. 4. If the list below has been completed, PLEASE FAX RECORDS TO OUR OFFICE @ [phone]. Once the records have been received we will update your records at our office. Health Maintenance Due   Topic Date Due    HIV screen  11/08/1988    DTaP/Tdap/Td vaccine (1 - Tdap) 11/08/1992    Cervical cancer screen  11/08/1994    Flu vaccine (1) 09/01/2018       Schedule your 2018 flu vaccine with Dr. Mcdonnell Bolds call [phone]!   [facility], for anyone 9 years or older   [address]   Every Monday   8:00am-11:00am and 1:00pm-3:00pm

## 2018-09-27 NOTE — PROGRESS NOTES
thyroid-  1-2 cap daily  Zinc Picolinate- 1 cap bid  IAG- 1 tsp bid Yes Historical Provider, MD   NONFORMULARY Constipation Net Relief- 2 cap nightly Yes Historical Provider, MD   Potassium 75 MG TABS Take 2 tablets by mouth daily as needed  Yes Historical Provider, MD   OLIVE LEAF EXTRACT PO Take 1 tablet by mouth daily as needed  Yes Historical Provider, MD   LYSINE PO Take 500 mg by mouth 4 times daily (before meals and nightly)  Yes Historical Provider, MD   Cyanocobalamin (VITAMIN B 12 PO) Take 1 tablet by mouth 2 times daily 2000 mg  Historical Provider, MD        Allergies   Allergen Reactions    Pcn [Penicillins] Shortness Of Breath and Rash       Past Medical History:   Diagnosis Date    Back pain     chronic for approximately 2 years    Lyme disease 1998       Past Surgical History:   Procedure Laterality Date    CHOLECYSTECTOMY  2008       Social History     Social History    Marital status:      Spouse name: N/A    Number of children: N/A    Years of education: N/A     Occupational History    Not on file. Social History Main Topics    Smoking status: Never Smoker    Smokeless tobacco: Never Used    Alcohol use No    Drug use: No    Sexual activity: Not on file     Other Topics Concern    Not on file     Social History Narrative    No narrative on file        Family History   Problem Relation Age of Onset    Heart Attack Father     Other Father         auto immune       Vitals:    09/27/18 1008   BP: 98/60   Site: Left Upper Arm   Position: Sitting   Cuff Size: Medium Adult   Resp: 12   Temp: 97.9 °F (36.6 °C)   TempSrc: Oral   Weight: 126 lb (57.2 kg)   Height: 5' 9.5\" (1.765 m)     Estimated body mass index is 18.34 kg/m² as calculated from the following:    Height as of this encounter: 5' 9.5\" (1.765 m). Weight as of this encounter: 126 lb (57.2 kg). Physical Exam   Constitutional: She is oriented to person, place, and time.  She appears well-developed and prefers to do several smaller blood draws over several days  - Patient instructed to check with insurer before each lab draw and to go to the lab which the insurer directs them for the most cost effective lab draw with the least patient's cost  - Gloria West  will be scheduled subsequent to those results. Ankita Chavez will bring in her drink and food log to her next visit    Chronic Problems Addressed on this Visit:                                   1.  Intensity of Service; Uncontrolled items at this visit; Chief Complaint   Patient presents with    Follow-up     rosanna cheng    Discuss Medications    Other     went thru ovulation, pain in groin, dizzy, nausea, almost went to ED    Dizziness     CIBO is acting up with supplements?   foods?  Constipation     issue with magnesium/ constipation stew relief    Abdominal Pain   ; Improved items reviewed at this visit; Stable items noted at this visit;  2. Patient's foods and drinks were reviewed with the patient,       - Gloria West will bring food+drink symptom log to next visit for inclusion in their record      - 75 better food list reviewed & given to patient with the omega 6 food list to avoid         - Gluten in corn and oats abstracts sheet reviewed and given to the patient today   3. Greater than 25 minutes were spent face to face on this visit of which >50% was for counseling and coordination of care. Patient's foods, drinks and supplements were reviewed with the patient,   - they will bring a food drink symptom log to future visits for inclusion in their record    - 75 better food list reviewed & given to patient along with the omega 6 food list to avoid      - Gluten in corn and oats abstracts sheet reviewed and given to the patient today    - 51 Foods containing Latex-like proteins was reviewed and copy given to the patient     - Nutrient Supplements list provided and copy given to the patient     - Apollidon. ZAPR web site offered to patient to review at their convenience by staff with login information    - www.efaeducation. org site reviewed with the patient so they can identify better foods    - www.cornicopia. org site reviewed with the patient so they can reduce carrageenan by reviewing the carrageenan buyers guide on that site and picking safer foods    Note:   I have discussed with the patient that with all nutraceuticals, there is often mixed data and emerging research which needs to be monitored; as well as an array of NIH fact sheets on nutrients and supplements. If I have recommended cinnamon at the request of this patient to assist them in control of their blood sugar, triglyceride and or weight issues. I discussed that the patient's clinical use of cinnamon bark, calcium, magnesium, Vitamin D and pharmaceutical grade CVS #[id number] fish oil or triple-strength fish oil, and balanced B-50 or B-100 time-released B complex which does not contain Vit C in the tablet. The dose will be for a time-limited trial to determine their individual effectiveness and tolerance in this patient. I also referred the patient to the reviewing such items as consumerlabs. com NMCD: Nutrition, Metabolism, and Cardiovascular Diseases (journal) and NCAAM.gov,  Searching www.nih.gov for any concerns about long-term use and hepatotoxicity of cinnamon and other nutrients and suggest they frequently search nih.gov for the latest non-proprietary information on nutriceuticals as well as consider a subscription to Wikirin for details on reviewed supplements, or at the least review the nutrient files at [facility], [address] bark, an insulin mimetic, reduces some High Carbohydrate Dietary Impacts.   Methylhydroxychalcone polymers insulin-enhancing properties in fat cells are responsible for enhanced glucose uptake, inhibiting hepatic HMG-CoA reductase and lowers lipids. www.jacn. org/content/20/4/327.full     But cinnamon with additives such as Cinnamon Extract are not effective as insulin mimetics. https://www.silver.net/     Nutrients for Start up from [employer] or grocery for ease to get started now ;  Helen Maradiaga has some useable products;  - Triple Strength Fish Oil, enteric coated  - Vit D 3 5000 IU gel caps  - Iron ferrous sulfat 325 mg tabs  - Centrum Silver look-a-like for most patients not needing iron, or  - Centrum plain look-a-like if need iron    Local pharmacy chains such as [facility], [address] [facility], [facility].  [facility];  - Triple Strength Fish Oil (enteric coated if available) or    If not enteric coated, can take from freezer for less burps  - B-50 or B-100 time released balanced B complex tabs not containing Vit C in tablet  - Cinnamon bark 500 mg (with Chromium but not extracts)   some brands list 1000 mg / serving of 2 500 mg capsules,    some brands have 1000 mg caps with the chromium but capsule size is huge  - Calcium carbonate/citrate, magnesium oxide/citrate, Vit D 3  as 3-4 tabs/caps/serving     Some Local Brands may contain Zinc which is acceptable for the first bottle or two  - Magnesium oxide 250 mg tabs for those having < 2 bowel movements daily  - Magnesium citrate TABLETS  or Slow Mag, or magnesium gluconate if having > 2 bowel movement/day  - Centrum Silver or look-a-like for most patients, Centrum plain or look-a-like with iron  - Vitamin D-3 comes as 1,000 IU or 2,000 IU or 5,000 IU gel caps or Liquid drops      Some brands containing or derived from soy oil or corn oil are OK if not allergic to soy  - Elemental Iron 65 mg tabs at bedtime is available over the counter if need more iron     Usually turns bowel movements grey, green or black but not a concern  - Apricot Kernel Oil (by Now) for dry skin and use in sensitive perineal area skin    Nutrients for ongoing use by Mail order for less expense from www.amazon. com, www.Printio.ru.[employer]Stri[employer], www.vitacost.BlueStripe Software   - Triple Strength Fish Oil , Softgels   - B-100 time released balanced B complex   - Cinnamon bark 500 mg with or without Chromium but not extract (ineffective)  - Calcium carbonate 1000 mg, Magnesium oxide 500 mg, Vit D 3 best as individual  - Magnesium oxide 250 mg, 400 mg, or 500 mg tabs if < 2 bowel movements daily  - Multivitamin Seniors for most patients, One Daily or Item with iron  - Vit D 3  1,000IU ,   2,000 IU,  5,000 IU, can start low and buy larger on 2nd bottle     Some brands containing or derived from soy oil or corn oil are OK if not allergic to soy    Nutrients for Special Needs by Mail order for less expense;  - Elemental Iron 65 mg tabs if need more iron for low iron on labs    Usually turns bowel movements grey, green or black but not a concern  - Time released Niacin 250 mg for cold intolerance, low libido or impotence  - DHEA 10 mg, 25 mg, or 50 mg for improving DHEA levels on labs if having Fatigue    If stools too loose substitute for your Magnesium oxide using;   Magnesium citrate 200 mg tabs(NOT liquid, NOT caps) amazon. com  Magnesium gluconate 550 mg by [facility] [place] at [place]. com  Magnesium chloride foot soaks or body sprays  www.LeukoDxs. [employer]Stripe Software   Magnesium chloride flakes for soaks, or magnesium spray or cream    Food Drink Symptom Log;  I asked this patient to track these items and any other symptoms on their list on a weekly basis to document their progress or lack of same.  This can be done on the symptom tracking sheet available to them at today's visit but looks like this:                                                      Rate on scale of 0-10 with zero = not noticeable  Symptom:                            Week 1               2                 3                 4               Etc            Hair loss    Foot cramps    Paresthesia    Aches    IBS (irritable bowel)    Constipation    Diarrhea  Nocturia    (up to bathroom at cantaloupe, carrots, cauliflower, celery, cucumbers, eggplant, grapes, green onions, greens, lettuce, onions, parsley, peas, peaches, bell peppers, potatoes, radishes, summer raspberries, squash, sweet corn, tomatoes, turnips, watermelons  August; apples, beans, beets, blueberries, cabbage, cantaloupe, carrots, cauliflower, celery, cucumbers, eggplant, grapes, green onions, greens, lettuce, onions, parsley, peas, peaches, pears, bell peppers, potatoes, radishes, squash, sweet corn, tomatoes, turnips, watermelons  September; apples, beans, beets, blueberries, cabbage, cantaloupe, carrots, cauliflower, celery, cucumbers, eggplant, grapes, green onions, greens, lettuce, onions, parsley, peas, peaches, pears, bell peppers, plums, potatoes, pumpkins, radishes, fall red raspberries, squash, sweet corn, tomatoes, turnips, watermelons  October; apples, beets, broccoli, cabbage, carrots, cauliflower, celery, green onions, greens, lettuce, parsley, peas, pears, potatoes, pumpkins, radishes, fall red raspberries, squash, turnips  November; broccoli, cabbage, carrots, parsley, pears, peas  December: use canned, frozen or dried fruits since lower in latex    Up to half of latex-sensitive patients show allergic reactions to fruits (avocados, bananas, kiwifruits, papayas, peaches),   Annals of Allergy, 1994. These plants contain the same proteins that are allergens in latex. People with fruit allergies should warn physicians before undergoing procedures which may cause anaphylactic reaction if in contact with latex gloves. Some of the common foods with defined cross-reactivity to latex are avocado, banana, kiwi, chestnut, raw potato, tomato, stone fruits (e.g., peach, cherry), hazelnut, melons, celery, carrot, apple, pear, papaya, and almond.   Foods with less well-defined cross-reactivity to latex are peanuts, peppers, citrus fruits, coconut, pineapple, cali, fig, passion fruit, Ugli fruit, and grape    This fruit/latex cross-reactivity is worsened by ethylene, a gas used to hasten commercial ripening. In nature, plants produce low levels of the hormone ethylene, which regulates germination, flowering, and ripening. Forced ripening by high ethylene concentrations, plants produce allergenic wound-repair proteins, which are similar to wound-repair proteins made during the tapping of rubber trees. Sensitive individuals who ingest the fruit get a higher dose and worse reaction. Some people may even first become sensitized to latex through fruit. Can food processing increase the concentrations of allergenic proteins? Latex-sensitized children (and adults) in [place] often experience allergic reactions after eating bananas ripened artificially with ethylene. In the [facility], food distribution centers treat unripe bananas and other produce with ethylene to ripen; not commonly done in [facility] since fruit is tree-ripened there. Does treatment of food with ethylene induce banana proteins that cross-react with latex? (Rangel et al.    References:   Latex in Foods Allergy, http://ehp.niehs.nih.gov/members/2003/5811/5811.html    Search web for \" Whats in Season \" for where you live or are at the time you food shop  www.nutritioncouncil.org/pdf/healthy/SeasonalProduce. pdf ,   Management of Latex, http://medicalcenter. osu.edu/  search for latex  An  electronic signature was used to authenticate this note.     --Ranulfo Garrido MD on 9/27/2018 at 11:12 AM

## 2018-10-01 ENCOUNTER — TELEPHONE (OUTPATIENT)
Dept: FAMILY MEDICINE CLINIC | Age: 43
End: 2018-10-01

## 2018-10-23 ENCOUNTER — TELEPHONE (OUTPATIENT)
Dept: FAMILY MEDICINE CLINIC | Age: 43
End: 2018-10-23

## 2018-10-23 ENCOUNTER — HOSPITAL ENCOUNTER (OUTPATIENT)
Age: 43
Discharge: HOME | End: 2018-10-23
Payer: MEDICAID

## 2018-10-23 DIAGNOSIS — R14.0 BLOATING: ICD-10-CM

## 2018-10-23 DIAGNOSIS — R11.0 NAUSEA: ICD-10-CM

## 2018-10-23 DIAGNOSIS — R42 DIZZINESS: ICD-10-CM

## 2018-10-23 DIAGNOSIS — R39.11 HESITANCY: ICD-10-CM

## 2018-10-23 DIAGNOSIS — R53.81 MALAISE: ICD-10-CM

## 2018-10-23 DIAGNOSIS — R14.0 BLOATING: Primary | ICD-10-CM

## 2018-10-23 LAB
ALBUMIN SERPL-MCNC: 4.5 G/DL (ref 3.5–5.1)
ALP BLD-CCNC: 36 U/L (ref 38–126)
ALT SERPL-CCNC: 19 U/L (ref 11–66)
AST SERPL-CCNC: 21 U/L (ref 5–40)
BACTERIA: ABNORMAL /HPF
BASOPHILS # BLD: 1.2 %
BASOPHILS ABSOLUTE: 0 THOU/MM3 (ref 0–0.1)
BILIRUB SERPL-MCNC: 0.7 MG/DL (ref 0.3–1.2)
BILIRUBIN DIRECT: < 0.2 MG/DL (ref 0–0.3)
BILIRUBIN URINE: NEGATIVE
BLOOD, URINE: ABNORMAL
CALCIUM SERPL-MCNC: 9.2 MG/DL (ref 8.5–10.5)
CASTS 2: ABNORMAL /LPF
CASTS UA: ABNORMAL /LPF
CHARACTER, URINE: CLEAR
COLOR: YELLOW
CRYSTALS, UA: ABNORMAL
EOSINOPHIL # BLD: 2.2 %
EOSINOPHILS ABSOLUTE: 0.1 THOU/MM3 (ref 0–0.4)
EPITHELIAL CELLS, UA: ABNORMAL /HPF
ERYTHROCYTE [DISTWIDTH] IN BLOOD BY AUTOMATED COUNT: 12.6 % (ref 11.5–14.5)
ERYTHROCYTE [DISTWIDTH] IN BLOOD BY AUTOMATED COUNT: 45 FL (ref 35–45)
GLUCOSE URINE: NEGATIVE MG/DL
HCT VFR BLD CALC: 41.1 % (ref 37–47)
HEMOGLOBIN: 13.7 GM/DL (ref 12–16)
IMMATURE GRANS (ABS): 0 THOU/MM3 (ref 0–0.07)
IMMATURE GRANULOCYTES: 0 %
KETONES, URINE: 15
LEUKOCYTE ESTERASE, URINE: ABNORMAL
LYMPHOCYTES # BLD: 41.2 %
LYMPHOCYTES ABSOLUTE: 1.3 THOU/MM3 (ref 1–4.8)
MAGNESIUM: 2.2 MG/DL (ref 1.6–2.4)
MCH RBC QN AUTO: 32.4 PG (ref 26–33)
MCHC RBC AUTO-ENTMCNC: 33.3 GM/DL (ref 32.2–35.5)
MCV RBC AUTO: 97.2 FL (ref 81–99)
MISCELLANEOUS 2: ABNORMAL
MONOCYTES # BLD: 6.8 %
MONOCYTES ABSOLUTE: 0.2 THOU/MM3 (ref 0.4–1.3)
NITRITE, URINE: NEGATIVE
NUCLEATED RED BLOOD CELLS: 0 /100 WBC
PH UA: 6.5
PLATELET # BLD: 158 THOU/MM3 (ref 130–400)
PMV BLD AUTO: 11.7 FL (ref 9.4–12.4)
PROTEIN UA: NEGATIVE
PTH INTACT: 34.1 PG/ML (ref 15–65)
RBC # BLD: 4.23 MILL/MM3 (ref 4.2–5.4)
RBC URINE: ABNORMAL /HPF
RENAL EPITHELIAL, UA: ABNORMAL
SEDIMENTATION RATE, ERYTHROCYTE: 6 MM/HR (ref 0–20)
SEG NEUTROPHILS: 48.6 %
SEGMENTED NEUTROPHILS ABSOLUTE COUNT: 1.6 THOU/MM3 (ref 1.8–7.7)
SPECIFIC GRAVITY, URINE: 1.01 (ref 1–1.03)
TOTAL PROTEIN: 7.1 G/DL (ref 6.1–8)
UROBILINOGEN, URINE: 0.2 EU/DL
VITAMIN D 25-HYDROXY: 68 NG/ML (ref 30–100)
WBC # BLD: 3.2 THOU/MM3 (ref 4.8–10.8)
WBC UA: ABNORMAL /HPF
YEAST: ABNORMAL

## 2018-10-23 PROCEDURE — 80076 HEPATIC FUNCTION PANEL: CPT

## 2018-10-23 PROCEDURE — 81001 URINALYSIS AUTO W/SCOPE: CPT

## 2018-10-23 PROCEDURE — 83735 ASSAY OF MAGNESIUM: CPT

## 2018-10-23 PROCEDURE — 82310 ASSAY OF CALCIUM: CPT

## 2018-10-23 PROCEDURE — 36415 COLL VENOUS BLD VENIPUNCTURE: CPT

## 2018-10-23 PROCEDURE — 86141 C-REACTIVE PROTEIN HS: CPT

## 2018-10-23 PROCEDURE — 85651 RBC SED RATE NONAUTOMATED: CPT

## 2018-10-23 PROCEDURE — 87086 URINE CULTURE/COLONY COUNT: CPT

## 2018-10-23 PROCEDURE — 85025 COMPLETE CBC W/AUTO DIFF WBC: CPT

## 2018-10-23 PROCEDURE — 82306 VITAMIN D 25 HYDROXY: CPT

## 2018-10-23 PROCEDURE — 83970 ASSAY OF PARATHORMONE: CPT

## 2018-10-24 LAB
C-REACTIVE PROTEIN, HIGH SENSITIVITY: < 0.2 MG/L
ORGANISM: ABNORMAL
URINE CULTURE REFLEX: ABNORMAL

## 2018-12-18 LAB
ABSOLUTE BASO #: 0.1 K/UL (ref 0–0.1)
ABSOLUTE EOS #: 0.1 K/UL (ref 0.1–0.4)
ABSOLUTE LYMPH #: 1.6 K/UL (ref 0.8–5.2)
ABSOLUTE MONO #: 0.4 K/UL (ref 0.1–0.9)
ABSOLUTE NEUT #: 2.9 K/UL (ref 1.3–9.1)
BASOPHILS RELATIVE PERCENT: 1.4 %
CALCIUM SERPL-MCNC: 9.3 MG/DL (ref 8.5–10.5)
EOSINOPHILS RELATIVE PERCENT: 1.9 %
ESTRADIOL LEVEL: 81 PG/ML
FOLLICLE STIMULATING HORMONE: 4.2 IU/L
HCT VFR BLD CALC: 40.3 % (ref 36–48)
HEMOGLOBIN: 13.9 G/DL (ref 12–16)
HIGH SENSITIVE C-REACTIVE PROTEIN: 0.17 MG/L
LH: 0.7 IU/L
LYMPHOCYTE %: 31.6 %
MAGNESIUM: 2.1 MG/DL (ref 1.6–2.6)
MCH RBC QN AUTO: 32.4 PG (ref 27–34)
MCHC RBC AUTO-ENTMCNC: 34.5 G/DL (ref 31–36)
MCV RBC AUTO: 93.9 FL (ref 80–100)
MONOCYTES # BLD: 8.5 %
NEUTROPHILS RELATIVE PERCENT: 56.4 %
PDW BLD-RTO: 12.3 % (ref 10.8–14.8)
PLATELETS: 165 K/UL (ref 150–450)
PROGESTERONE LEVEL: 10.48 NG/ML
RBC: 4.29 M/UL (ref 4–5.5)
SEDIMENTATION RATE, ERYTHROCYTE: 2 MM/HR (ref 0–30)
T3 TOTAL: 88 NG/DL (ref 80–200)
T4 TOTAL: 6.3 UG/DL (ref 4.5–12)
TSH SERPL DL<=0.05 MIU/L-ACNC: 2.57 UIU/ML (ref 0.4–4.1)
WBC: 5.2 K/UL (ref 3.7–10.8)

## 2018-12-19 LAB
DHEAS (DHEA SULFATE): 146 UG/DL (ref 27–206)
GLIADIN ANTIBODIES IGA: 3.5 U/ML
GLIADIN ANTIBODIES IGG: 1.1 U/ML
PARATHYROID HORMONE INTACT: 16 PG/ML (ref 11–67)
THYROID PEROXIDASE ANTIBODY: 347 IU/ML
VITAMIN D 25-HYDROXY: 52 NG/ML

## 2018-12-20 LAB — TESTOSTERONE, LCMS: 20 NG/DL (ref 9–55)

## 2018-12-21 ENCOUNTER — TELEPHONE (OUTPATIENT)
Dept: FAMILY MEDICINE CLINIC | Age: 43
End: 2018-12-21

## 2018-12-21 LAB — DEHYDROEPIANDROSTERONE: 1.9 NG/ML (ref 1.3–9.8)

## 2019-01-03 ENCOUNTER — OFFICE VISIT (OUTPATIENT)
Dept: FAMILY MEDICINE CLINIC | Age: 44
End: 2019-01-03
Payer: MEDICAID

## 2019-01-03 VITALS
DIASTOLIC BLOOD PRESSURE: 58 MMHG | RESPIRATION RATE: 10 BRPM | SYSTOLIC BLOOD PRESSURE: 90 MMHG | TEMPERATURE: 97.4 F | WEIGHT: 130.8 LBS | HEIGHT: 69 IN | BODY MASS INDEX: 19.37 KG/M2

## 2019-01-03 DIAGNOSIS — R41.3 MEMORY DIFFICULTIES: ICD-10-CM

## 2019-01-03 DIAGNOSIS — R06.02 SHORTNESS OF BREATH: ICD-10-CM

## 2019-01-03 DIAGNOSIS — R14.0 BLOATING: ICD-10-CM

## 2019-01-03 DIAGNOSIS — Z86.19 H/O LYME DISEASE: ICD-10-CM

## 2019-01-03 DIAGNOSIS — K59.01 SLOW TRANSIT CONSTIPATION: ICD-10-CM

## 2019-01-03 DIAGNOSIS — R52 ACHES: ICD-10-CM

## 2019-01-03 DIAGNOSIS — R10.13 EPIGASTRIC PAIN: ICD-10-CM

## 2019-01-03 DIAGNOSIS — G47.9 SLEEP DIFFICULTIES: ICD-10-CM

## 2019-01-03 DIAGNOSIS — R39.11 HESITANCY: ICD-10-CM

## 2019-01-03 DIAGNOSIS — R35.1 NOCTURIA: ICD-10-CM

## 2019-01-03 DIAGNOSIS — R11.0 NAUSEA: ICD-10-CM

## 2019-01-03 DIAGNOSIS — R42 DIZZINESS: ICD-10-CM

## 2019-01-03 DIAGNOSIS — R53.83 TIRED: ICD-10-CM

## 2019-01-03 DIAGNOSIS — F41.9 ANXIETY: ICD-10-CM

## 2019-01-03 DIAGNOSIS — R53.81 MALAISE: ICD-10-CM

## 2019-01-03 PROCEDURE — G8420 CALC BMI NORM PARAMETERS: HCPCS | Performed by: FAMILY MEDICINE

## 2019-01-03 PROCEDURE — G8484 FLU IMMUNIZE NO ADMIN: HCPCS | Performed by: FAMILY MEDICINE

## 2019-01-03 PROCEDURE — G8427 DOCREV CUR MEDS BY ELIG CLIN: HCPCS | Performed by: FAMILY MEDICINE

## 2019-01-03 PROCEDURE — 1036F TOBACCO NON-USER: CPT | Performed by: FAMILY MEDICINE

## 2019-01-03 PROCEDURE — 99215 OFFICE O/P EST HI 40 MIN: CPT | Performed by: FAMILY MEDICINE

## 2019-01-03 RX ORDER — VITAMIN E 268 MG
400 CAPSULE ORAL DAILY
COMMUNITY

## 2019-01-03 RX ORDER — MULTIVIT WITH MINERALS/LUTEIN
1000 TABLET ORAL NIGHTLY
COMMUNITY

## 2019-01-03 ASSESSMENT — ENCOUNTER SYMPTOMS
ABDOMINAL PAIN: 1
BACK PAIN: 1
SHORTNESS OF BREATH: 1
ABDOMINAL DISTENTION: 1

## 2019-01-21 ENCOUNTER — PATIENT MESSAGE (OUTPATIENT)
Dept: FAMILY MEDICINE CLINIC | Age: 44
End: 2019-01-21

## 2019-01-24 ENCOUNTER — PATIENT MESSAGE (OUTPATIENT)
Dept: FAMILY MEDICINE CLINIC | Age: 44
End: 2019-01-24

## 2019-01-25 ENCOUNTER — TELEPHONE (OUTPATIENT)
Dept: FAMILY MEDICINE CLINIC | Age: 44
End: 2019-01-25

## 2019-01-25 ENCOUNTER — PATIENT MESSAGE (OUTPATIENT)
Dept: FAMILY MEDICINE CLINIC | Age: 44
End: 2019-01-25

## 2019-01-25 DIAGNOSIS — K90.9 DIARRHEA DUE TO MALABSORPTION: ICD-10-CM

## 2019-01-25 DIAGNOSIS — R06.02 SHORTNESS OF BREATH: ICD-10-CM

## 2019-01-25 DIAGNOSIS — R19.7 DIARRHEA DUE TO MALABSORPTION: ICD-10-CM

## 2019-01-25 DIAGNOSIS — R39.11 HESITANCY: ICD-10-CM

## 2019-01-25 DIAGNOSIS — R11.0 NAUSEA: ICD-10-CM

## 2019-01-25 DIAGNOSIS — R42 DIZZINESS: ICD-10-CM

## 2019-01-25 DIAGNOSIS — R52 ACHES: ICD-10-CM

## 2019-01-25 DIAGNOSIS — G44.1 OTHER VASCULAR HEADACHE: ICD-10-CM

## 2019-01-25 DIAGNOSIS — F41.9 ANXIETY: ICD-10-CM

## 2019-01-25 DIAGNOSIS — R14.0 BLOATING: Primary | ICD-10-CM

## 2019-01-25 DIAGNOSIS — Z86.19 H/O LYME DISEASE: ICD-10-CM

## 2019-01-25 DIAGNOSIS — R41.3 MEMORY DIFFICULTIES: ICD-10-CM

## 2019-01-25 DIAGNOSIS — R53.81 MALAISE: ICD-10-CM

## 2019-01-25 DIAGNOSIS — R10.13 EPIGASTRIC PAIN: ICD-10-CM

## 2019-01-25 DIAGNOSIS — G47.9 SLEEP DIFFICULTIES: ICD-10-CM

## 2019-01-25 DIAGNOSIS — R35.1 NOCTURIA: ICD-10-CM

## 2019-01-25 DIAGNOSIS — R53.83 TIRED: ICD-10-CM

## 2019-01-25 DIAGNOSIS — K59.01 SLOW TRANSIT CONSTIPATION: ICD-10-CM

## 2019-01-29 LAB
ALBUMIN SERPL-MCNC: 4.4 G/DL (ref 3.5–5.2)
ALK PHOSPHATASE: 45 U/L (ref 30–101)
ALT SERPL-CCNC: 22 U/L (ref 5–40)
AST SERPL-CCNC: 21 U/L (ref 9–40)
BILIRUB SERPL-MCNC: 0.3 MG/DL
BILIRUBIN DIRECT: <0.2 MG/DL
CALCIUM SERPL-MCNC: 9.5 MG/DL (ref 8.5–10.5)
MAGNESIUM: 2 MG/DL (ref 1.6–2.6)
TOTAL PROTEIN: 6.8 G/DL (ref 6.1–8.3)

## 2019-01-30 LAB
DHEAS (DHEA SULFATE): 69 UG/DL (ref 27–206)
PARATHYROID HORMONE INTACT: 23 PG/ML (ref 11–67)
VITAMIN D 25-HYDROXY: 59 NG/ML

## 2019-02-01 LAB
DEHYDROEPIANDROSTERONE: 1.4 NG/ML (ref 1.3–9.8)
TESTOSTERONE, LCMS: 24 NG/DL (ref 9–55)

## 2019-02-02 LAB
METANEPHRINE, FREE, PLASMA: 29 PG/ML
NORMETANEPHRINE FREE PLASMA: 71 PG/ML
TOTAL, FREE (MN + NMN): 100 PG/ML

## 2019-02-19 ENCOUNTER — TELEPHONE (OUTPATIENT)
Dept: FAMILY MEDICINE CLINIC | Age: 44
End: 2019-02-19

## 2019-03-18 DIAGNOSIS — R10.13 EPIGASTRIC PAIN: ICD-10-CM

## 2019-03-18 DIAGNOSIS — R06.02 SHORTNESS OF BREATH: ICD-10-CM

## 2019-03-18 DIAGNOSIS — G44.1 OTHER VASCULAR HEADACHE: ICD-10-CM

## 2019-03-18 DIAGNOSIS — F41.9 ANXIETY: ICD-10-CM

## 2019-03-18 DIAGNOSIS — K59.01 SLOW TRANSIT CONSTIPATION: ICD-10-CM

## 2019-03-18 DIAGNOSIS — R53.83 TIRED: ICD-10-CM

## 2019-03-18 DIAGNOSIS — R52 ACHES: ICD-10-CM

## 2019-03-18 DIAGNOSIS — R11.0 NAUSEA: ICD-10-CM

## 2019-03-18 DIAGNOSIS — R41.3 MEMORY DIFFICULTIES: ICD-10-CM

## 2019-03-18 DIAGNOSIS — R39.11 HESITANCY: ICD-10-CM

## 2019-03-18 DIAGNOSIS — R35.1 NOCTURIA: ICD-10-CM

## 2019-03-18 DIAGNOSIS — K90.9 DIARRHEA DUE TO MALABSORPTION: ICD-10-CM

## 2019-03-18 DIAGNOSIS — R53.81 MALAISE: ICD-10-CM

## 2019-03-18 DIAGNOSIS — R42 DIZZINESS: ICD-10-CM

## 2019-03-18 DIAGNOSIS — G47.9 SLEEP DIFFICULTIES: ICD-10-CM

## 2019-03-18 DIAGNOSIS — R19.7 DIARRHEA DUE TO MALABSORPTION: ICD-10-CM

## 2019-03-18 DIAGNOSIS — R14.0 BLOATING: Primary | ICD-10-CM

## 2019-03-26 LAB
FOLATE: >20 UG/L
URIC ACID: 2.6 MG/DL (ref 2.7–6.1)
VITAMIN B-12: 1377 PG/ML (ref 200–950)

## 2019-03-28 LAB — VITAMIN B1, PLASMA: 7 NMOL/L (ref 4–15)

## 2019-03-29 LAB — VITAMIN B12 BIND CAP: 1226 PG/ML (ref 800–2600)

## 2019-03-30 LAB — VITAMIN B6: 157 NMOL/L (ref 20–125)

## 2019-03-31 LAB — VITAMIN B2: 9 NMOL/L (ref 5–50)

## 2019-04-04 ENCOUNTER — TELEPHONE (OUTPATIENT)
Dept: FAMILY MEDICINE CLINIC | Age: 44
End: 2019-04-04

## 2019-04-04 NOTE — TELEPHONE ENCOUNTER
Pt called and yun Daigle see uric acid level in My Chart- states to call md office. Looked at chart, called pt back. Advised it was just 0.1 pt below normal.  She voiced understanding, I asked if she wants to be r/s for an appt? She declined at this time.

## 2019-05-20 ENCOUNTER — TELEPHONE (OUTPATIENT)
Dept: FAMILY MEDICINE CLINIC | Age: 44
End: 2019-05-20

## 2019-05-20 DIAGNOSIS — R19.7 DIARRHEA DUE TO MALABSORPTION: Primary | ICD-10-CM

## 2019-05-20 DIAGNOSIS — K90.9 DIARRHEA DUE TO MALABSORPTION: Primary | ICD-10-CM

## 2019-05-20 NOTE — TELEPHONE ENCOUNTER
Pt would like to add a potassium level to her orders. Pended order please add Dx. Fax to [employer].

## 2019-05-22 LAB
ABSOLUTE BASO #: 0.1 K/UL (ref 0–0.1)
ABSOLUTE EOS #: 0.1 K/UL (ref 0.1–0.4)
ABSOLUTE LYMPH #: 1.3 K/UL (ref 0.8–5.2)
ABSOLUTE MONO #: 0.4 K/UL (ref 0.1–0.9)
ABSOLUTE NEUT #: 3 K/UL (ref 1.3–9.1)
BASOPHILS RELATIVE PERCENT: 1.1 %
CALCIUM SERPL-MCNC: 9.5 MG/DL (ref 8.5–10.5)
EOSINOPHILS RELATIVE PERCENT: 1.1 %
ESTRADIOL LEVEL: 105.8 PG/ML
FOLLICLE STIMULATING HORMONE: 4.7 MIU/ML
HCT VFR BLD CALC: 42.2 % (ref 36–48)
HEMOGLOBIN: 15 G/DL (ref 12–16)
HIGH SENSITIVE C-REACTIVE PROTEIN: 0.03 MG/DL (ref 0–0.74)
LH: 1.6 MIU/ML
LYMPHOCYTE %: 26.9 %
MAGNESIUM: 2.2 MG/DL (ref 1.8–2.6)
MCH RBC QN AUTO: 34.2 PG (ref 27–34)
MCHC RBC AUTO-ENTMCNC: 35.5 G/DL (ref 31–36)
MCV RBC AUTO: 96.1 FL (ref 80–100)
MONOCYTES # BLD: 8 %
NEUTROPHILS RELATIVE PERCENT: 62.5 %
PDW BLD-RTO: 11.8 % (ref 10.8–14.8)
PLATELETS: 155 K/UL (ref 150–450)
POTASSIUM SERPL-SCNC: 4 MMOL/L (ref 3.5–5)
PROGESTERONE LEVEL: 20.9 NG/ML
RBC: 4.39 M/UL (ref 4–5.5)
SEDIMENTATION RATE, ERYTHROCYTE: 4 MM/HR (ref 0–30)
T3 TOTAL: 162 NG/DL (ref 87–178)
T4 TOTAL: 6.4 UG/DL (ref 6.1–12.2)
TSH SERPL DL<=0.05 MIU/L-ACNC: 2.19 UIU/ML (ref 0.49–4.67)
WBC: 4.8 K/UL (ref 3.7–10.8)

## 2019-05-23 LAB
ADRENOCORTICOTROPIC HORMONE: 7.5 PG/ML (ref 7.2–63.3)
DHEAS (DHEA SULFATE): 158 UG/DL (ref 35–256)
GLIADIN ANTIBODIES IGA: 3.9 U/ML
GLIADIN ANTIBODIES IGG: 1.4 U/ML
HOMOCYSTINE, SERUM: 7 UMOL/L
PARATHYROID HORMONE INTACT: 36 PG/ML (ref 15–65)
THYROID PEROXIDASE ANTIBODY: 718 IU/ML
VITAMIN D 25-HYDROXY: 53 NG/ML

## 2019-05-24 LAB
ALBUMIN SERUM: 5.1 G/DL (ref 3.6–5.1)
DEHYDROEPIANDROSTERONE: 2.1 NG/ML (ref 1.3–9.8)
SEX HORMONE BINDING GLOBULIN: 162.2 NMOL/L (ref 24.6–122)
TESTOSTERONE FREE: 0.9 PG/ML (ref 1.1–5.8)
TESTOSTERONE, LCMS: 17 NG/DL (ref 9–55)

## 2019-05-30 LAB — VITAMIN B7: 950.5 PG/ML (ref 221–3004)

## 2019-06-18 ENCOUNTER — HOSPITAL ENCOUNTER (OUTPATIENT)
Dept: WOMENS IMAGING | Age: 44
Discharge: HOME | End: 2019-06-18
Payer: COMMERCIAL

## 2019-06-18 ENCOUNTER — HOSPITAL ENCOUNTER (OUTPATIENT)
Dept: WOMENS IMAGING | Age: 44
End: 2019-06-18
Payer: COMMERCIAL

## 2019-06-18 DIAGNOSIS — N64.4 MASTODYNIA: ICD-10-CM

## 2019-06-18 DIAGNOSIS — N64.4 BREAST PAIN: ICD-10-CM

## 2019-06-18 PROCEDURE — 76642 ULTRASOUND BREAST LIMITED: CPT

## 2019-07-17 ENCOUNTER — HOSPITAL ENCOUNTER (OUTPATIENT)
Dept: NON INVASIVE DIAGNOSTICS | Age: 44
Discharge: HOME | End: 2019-07-17
Payer: COMMERCIAL

## 2019-07-17 VITALS — HEIGHT: 69 IN | WEIGHT: 135 LBS | BODY MASS INDEX: 19.99 KG/M2

## 2019-07-17 PROCEDURE — 93017 CV STRESS TEST TRACING ONLY: CPT | Performed by: INTERNAL MEDICINE

## 2019-07-29 ENCOUNTER — PATIENT MESSAGE (OUTPATIENT)
Dept: FAMILY MEDICINE CLINIC | Age: 44
End: 2019-07-29

## 2019-07-29 DIAGNOSIS — F41.9 ANXIETY: ICD-10-CM

## 2019-07-29 DIAGNOSIS — R53.83 TIRED: Primary | ICD-10-CM

## 2019-07-29 NOTE — TELEPHONE ENCOUNTER
From: Justice Groves  To: Desiree Marlow MD  Sent: 7/29/2019 8:23 AM EDT  Subject: Non-Urgent Medical Question    I started the B100 again after stopping for 60 days like you told me(taking half at each meal.) I'm starting to get the same feelings again with nerve pain . Can i get a b6 and b12 blood test again to see if they are still too high? .(sent to [facility]).

## 2019-10-11 ENCOUNTER — PATIENT MESSAGE (OUTPATIENT)
Dept: FAMILY MEDICINE CLINIC | Age: 44
End: 2019-10-11

## 2019-10-22 DIAGNOSIS — R53.81 MALAISE: ICD-10-CM

## 2019-10-22 DIAGNOSIS — G47.9 SLEEP DIFFICULTIES: ICD-10-CM

## 2019-10-22 DIAGNOSIS — R11.0 NAUSEA: ICD-10-CM

## 2019-10-22 DIAGNOSIS — R52 ACHES: ICD-10-CM

## 2019-10-22 DIAGNOSIS — G44.1 OTHER VASCULAR HEADACHE: ICD-10-CM

## 2019-10-22 DIAGNOSIS — R19.7 DIARRHEA DUE TO MALABSORPTION: ICD-10-CM

## 2019-10-22 DIAGNOSIS — K59.01 SLOW TRANSIT CONSTIPATION: ICD-10-CM

## 2019-10-22 DIAGNOSIS — R35.1 NOCTURIA: ICD-10-CM

## 2019-10-22 DIAGNOSIS — R39.11 HESITANCY: ICD-10-CM

## 2019-10-22 DIAGNOSIS — F41.9 ANXIETY: ICD-10-CM

## 2019-10-22 DIAGNOSIS — R41.3 MEMORY DIFFICULTIES: ICD-10-CM

## 2019-10-22 DIAGNOSIS — R53.83 TIRED: Primary | ICD-10-CM

## 2019-10-22 DIAGNOSIS — R06.02 SHORTNESS OF BREATH: ICD-10-CM

## 2019-10-22 DIAGNOSIS — R42 DIZZINESS: ICD-10-CM

## 2019-10-22 DIAGNOSIS — K90.9 DIARRHEA DUE TO MALABSORPTION: ICD-10-CM

## 2019-10-22 DIAGNOSIS — R14.0 BLOATING: ICD-10-CM

## 2019-10-22 DIAGNOSIS — R10.13 EPIGASTRIC PAIN: ICD-10-CM

## 2019-10-24 ENCOUNTER — TELEPHONE (OUTPATIENT)
Dept: FAMILY MEDICINE CLINIC | Age: 44
End: 2019-10-24

## 2019-11-06 LAB
ABSOLUTE BASO #: 0 X10E9/L (ref 0–0.9)
ABSOLUTE EOS #: 0.2 X10E9/L (ref 0–0.4)
ABSOLUTE LYMPH #: 1.1 X10E9/L (ref 1–3.5)
ABSOLUTE MONO #: 0.3 X10E9/L (ref 0–0.9)
ABSOLUTE NEUT #: 2.1 X10E9/L (ref 1.5–6.6)
AMYLASE: 62 U/L (ref 28–100)
ANION GAP SERPL CALCULATED.3IONS-SCNC: 7 MMOL/L (ref 4–12)
ANTI-NUCLEAR ANTIBODY (ANA): NEGATIVE
AVERAGE GLUCOSE: 100 MG/DL
BASOPHILS RELATIVE PERCENT: 1.1 %
BUN BLDV-MCNC: 7 MG/DL (ref 5–23)
CALCIUM SERPL-MCNC: 9.4 MG/DL (ref 8.5–10.5)
CEA: 3.3 NG/ML (ref 0–3)
CHLORIDE BLD-SCNC: 99 MMOL/L (ref 98–109)
CHOLESTEROL/HDL RATIO: 3 (ref 1–5)
CHOLESTEROL: 217 MG/DL (ref 150–200)
CO2: 31 MMOL/L (ref 22–32)
CREAT SERPL-MCNC: 0.79 MG/DL (ref 0.4–1)
EGFR AFRICAN AMERICAN: >60 ML/MIN/1.73SQ.M
EGFR IF NONAFRICAN AMERICAN: >60 ML/MIN/1.73SQ.M
EOSINOPHILS RELATIVE PERCENT: 5.8 %
ESTRADIOL LEVEL: 230.1 PG/ML
FOLATE: 19.3 NG/ML
FOLLICLE STIMULATING HORMONE: 5.7 MIU/ML
GLUCOSE: 84 MG/DL (ref 65–99)
HBA1C MFR BLD: 5.1 % (ref 4.4–6.4)
HCT VFR BLD CALC: 39 % (ref 35–47)
HDLC SERPL-MCNC: 73 MG/DL
HEMOGLOBIN: 13.5 G/DL (ref 11.7–16)
HIGH SENSITIVE C-REACTIVE PROTEIN: <0.02 MG/DL (ref 0–0.74)
IRON SATURATION: 67 % SATURATION (ref 15–50)
IRON, SERUM: 205 UG/DL (ref 50–170)
LDL CHOLESTEROL CALCULATED: 126 MG/DL
LH: 4.7 MIU/ML
LIPASE: 48 U/L (ref 11–82)
LYMPHOCYTE %: 28.3 %
MAGNESIUM: 2 MG/DL (ref 1.8–2.6)
MCH RBC QN AUTO: 33.5 PG (ref 26–33.5)
MCHC RBC AUTO-ENTMCNC: 34.5 G/DL (ref 32–36)
MCV RBC AUTO: 97 FL (ref 81–100)
MONOCYTES # BLD: 8.4 %
NEUTROPHILS RELATIVE PERCENT: 56.4 %
PDW BLD-RTO: 13.4 % (ref 11.5–14.7)
PLATELETS: 185 X10E9/L (ref 150–450)
PMV BLD AUTO: 10.1 FL (ref 7–12)
POTASSIUM SERPL-SCNC: 4.3 MMOL/L (ref 3.5–5)
PROGESTERONE LEVEL: 0.6 NG/ML
RBC: 4.02 X10E12/L (ref 3.8–5.2)
RHEUMATOID FACTOR: <10 IU/ML
SEDIMENTATION RATE, ERYTHROCYTE: 8 MM/H (ref 0–20)
SODIUM BLD-SCNC: 137 MMOL/L (ref 134–146)
T3 TOTAL: 157 NG/DL (ref 87–178)
T4 TOTAL: 8.8 UG/DL (ref 6.1–12.2)
TOTAL IRON BINDING CAPACITY: 305 UG/DL (ref 250–425)
TRIGL SERPL-MCNC: 91 MG/DL (ref 27–150)
TSH SERPL DL<=0.05 MIU/L-ACNC: 1.84 UIU/ML (ref 0.49–4.67)
URIC ACID: 2.7 MG/DL (ref 2.6–7.2)
VITAMIN B-12: 757 PG/ML (ref 180–914)
VLDLC SERPL CALC-MCNC: 18 MG/DL (ref 0–30)
WBC: 3.8 X10E9/L (ref 4.8–10.8)

## 2019-11-07 LAB
DHEAS (DHEA SULFATE): 59 UG/DL (ref 35–256)
GLIADIN ANTIBODIES IGA: 1.8 U/ML
GLIADIN ANTIBODIES IGG: 1.2 U/ML
HOMOCYSTINE, SERUM: 8 UMOL/L
PARATHYROID HORMONE INTACT: 34 PG/ML (ref 15–65)
THYROID PEROXIDASE ANTIBODY: 370 IU/ML
VITAMIN D 25-HYDROXY: 63 NG/ML

## 2019-11-08 LAB — DEHYDROEPIANDROSTERONE: 2.2 NG/ML (ref 1.3–9.8)

## 2019-11-09 LAB
ALBUMIN SERUM: 4.4 G/DL (ref 3.6–5.1)
SEX HORMONE BINDING GLOBULIN: 123 NMOL/L (ref 24.6–122)
TESTOSTERONE FREE: 1.1 PG/ML (ref 1.1–5.8)
TESTOSTERONE, LCMS: 16 NG/DL (ref 9–55)

## 2019-11-11 LAB — VITAMIN B6: 87 NMOL/L (ref 20–125)

## 2024-04-09 ENCOUNTER — TELEPHONE (OUTPATIENT)
Dept: FAMILY MEDICINE CLINIC | Age: 49
End: 2024-04-09

## 2024-04-09 NOTE — TELEPHONE ENCOUNTER
Former pt called.  Said she didn't want to come in just had a question about the [facility]S fish oil.  Advised her what to take instead. ( [employer] Ultra Omega3 or [facility])  She thanked me.  Said still doing the program and has had no problems since 2019.

## 2024-05-23 ENCOUNTER — APPOINTMENT (OUTPATIENT)
Dept: CT IMAGING | Age: 49
End: 2024-05-23
Payer: COMMERCIAL

## 2024-05-23 ENCOUNTER — HOSPITAL ENCOUNTER (EMERGENCY)
Age: 49
Discharge: HOME | End: 2024-05-23
Payer: COMMERCIAL

## 2024-05-23 ENCOUNTER — NURSE ONLY (OUTPATIENT)
Dept: LAB | Age: 49
End: 2024-05-23

## 2024-05-23 VITALS
WEIGHT: 153 LBS | DIASTOLIC BLOOD PRESSURE: 70 MMHG | TEMPERATURE: 98.3 F | RESPIRATION RATE: 16 BRPM | HEART RATE: 52 BPM | OXYGEN SATURATION: 100 % | BODY MASS INDEX: 22.59 KG/M2 | SYSTOLIC BLOOD PRESSURE: 121 MMHG

## 2024-05-23 DIAGNOSIS — R10.84 GENERALIZED ABDOMINAL PAIN: Primary | ICD-10-CM

## 2024-05-23 LAB
ALBUMIN SERPL BCG-MCNC: 4.2 G/DL (ref 3.5–5.1)
ALP SERPL-CCNC: 47 U/L (ref 38–126)
ALT SERPL W/O P-5'-P-CCNC: 14 U/L (ref 11–66)
ANION GAP SERPL CALC-SCNC: 8 MEQ/L (ref 8–16)
AST SERPL-CCNC: 18 U/L (ref 5–40)
BASOPHILS ABSOLUTE: 0.1 THOU/MM3 (ref 0–0.1)
BASOPHILS NFR BLD AUTO: 0.6 %
BILIRUB SERPL-MCNC: 0.5 MG/DL (ref 0.3–1.2)
BILIRUB UR QL STRIP.AUTO: NEGATIVE
BUN SERPL-MCNC: 8 MG/DL (ref 7–22)
CALCIUM SERPL-MCNC: 8.3 MG/DL (ref 8.5–10.5)
CHARACTER UR: CLEAR
CHLORIDE SERPL-SCNC: 102 MEQ/L (ref 98–111)
CO2 SERPL-SCNC: 23 MEQ/L (ref 23–33)
COLOR: YELLOW
CREAT SERPL-MCNC: 0.7 MG/DL (ref 0.4–1.2)
DEPRECATED RDW RBC AUTO: 42.9 FL (ref 35–45)
EOSINOPHIL NFR BLD AUTO: 0.8 %
EOSINOPHILS ABSOLUTE: 0.1 THOU/MM3 (ref 0–0.4)
ERYTHROCYTE [DISTWIDTH] IN BLOOD BY AUTOMATED COUNT: 11.9 % (ref 11.5–14.5)
GFR SERPL CREATININE-BSD FRML MDRD: > 90 ML/MIN/1.73M2
GLUCOSE SERPL-MCNC: 93 MG/DL (ref 70–108)
GLUCOSE UR QL STRIP.AUTO: NEGATIVE MG/DL
HCT VFR BLD AUTO: 40.3 % (ref 37–47)
HGB BLD-MCNC: 13.4 GM/DL (ref 12–16)
HGB UR QL STRIP.AUTO: NEGATIVE
IMM GRANULOCYTES # BLD AUTO: 0.03 THOU/MM3 (ref 0–0.07)
IMM GRANULOCYTES NFR BLD AUTO: 0.3 %
KETONES UR QL STRIP.AUTO: 15
LYMPHOCYTES ABSOLUTE: 1.5 THOU/MM3 (ref 1–4.8)
LYMPHOCYTES NFR BLD AUTO: 14.4 %
MCH RBC QN AUTO: 32.6 PG (ref 26–33)
MCHC RBC AUTO-ENTMCNC: 33.3 GM/DL (ref 32.2–35.5)
MCV RBC AUTO: 98.1 FL (ref 81–99)
MONOCYTES ABSOLUTE: 0.7 THOU/MM3 (ref 0.4–1.3)
MONOCYTES NFR BLD AUTO: 6.8 %
NEUTROPHILS ABSOLUTE: 8.2 THOU/MM3 (ref 1.8–7.7)
NEUTROPHILS NFR BLD AUTO: 77.1 %
NITRITE UR QL STRIP: NEGATIVE
NRBC BLD AUTO-RTO: 0 /100 WBC
OSMOLALITY SERPL CALC.SUM OF ELEC: 264.4 MOSMOL/KG (ref 275–300)
PH UR STRIP.AUTO: 6.5 [PH] (ref 5–9)
PLATELET # BLD AUTO: 150 THOU/MM3 (ref 130–400)
PMV BLD AUTO: 11.9 FL (ref 9.4–12.4)
POTASSIUM SERPL-SCNC: 3.6 MEQ/L (ref 3.5–5.2)
PROT SERPL-MCNC: 6.6 G/DL (ref 6.1–8)
PROT UR STRIP.AUTO-MCNC: NEGATIVE MG/DL
RBC # BLD AUTO: 4.11 MILL/MM3 (ref 4.2–5.4)
SODIUM SERPL-SCNC: 133 MEQ/L (ref 135–145)
SP GR UR REFRACT.AUTO: < 1.005 (ref 1–1.03)
UROBILINOGEN, URINE: 0.2 EU/DL (ref 0–1)
WBC # BLD AUTO: 10.6 THOU/MM3 (ref 4.8–10.8)
WBC #/AREA URNS HPF: NEGATIVE /[HPF]

## 2024-05-23 PROCEDURE — 36415 COLL VENOUS BLD VENIPUNCTURE: CPT

## 2024-05-23 PROCEDURE — 6360000002 HC RX W HCPCS: Performed by: PHYSICIAN ASSISTANT

## 2024-05-23 PROCEDURE — 74176 CT ABD & PELVIS W/O CONTRAST: CPT

## 2024-05-23 PROCEDURE — 96361 HYDRATE IV INFUSION ADD-ON: CPT

## 2024-05-23 PROCEDURE — 2580000003 HC RX 258: Performed by: PHYSICIAN ASSISTANT

## 2024-05-23 PROCEDURE — 96374 THER/PROPH/DIAG INJ IV PUSH: CPT

## 2024-05-23 PROCEDURE — 80053 COMPREHEN METABOLIC PANEL: CPT

## 2024-05-23 PROCEDURE — 85025 COMPLETE CBC W/AUTO DIFF WBC: CPT

## 2024-05-23 PROCEDURE — 99284 EMERGENCY DEPT VISIT MOD MDM: CPT

## 2024-05-23 PROCEDURE — 81003 URINALYSIS AUTO W/O SCOPE: CPT

## 2024-05-23 RX ORDER — 0.9 % SODIUM CHLORIDE 0.9 %
1000 INTRAVENOUS SOLUTION INTRAVENOUS ONCE
Status: COMPLETED | OUTPATIENT
Start: 2024-05-23 | End: 2024-05-23

## 2024-05-23 RX ORDER — KETOROLAC TROMETHAMINE 10 MG/1
10 TABLET, FILM COATED ORAL EVERY 6 HOURS PRN
Qty: 15 TABLET | Refills: 0 | Status: SHIPPED | OUTPATIENT
Start: 2024-05-23

## 2024-05-23 RX ORDER — ONDANSETRON 4 MG/1
4 TABLET, ORALLY DISINTEGRATING ORAL EVERY 8 HOURS PRN
Qty: 20 TABLET | Refills: 0 | Status: SHIPPED | OUTPATIENT
Start: 2024-05-23

## 2024-05-23 RX ORDER — KETOROLAC TROMETHAMINE 30 MG/ML
30 INJECTION, SOLUTION INTRAMUSCULAR; INTRAVENOUS ONCE
Status: COMPLETED | OUTPATIENT
Start: 2024-05-23 | End: 2024-05-23

## 2024-05-23 RX ORDER — ONDANSETRON 2 MG/ML
4 INJECTION INTRAMUSCULAR; INTRAVENOUS ONCE
Status: DISCONTINUED | OUTPATIENT
Start: 2024-05-23 | End: 2024-05-23 | Stop reason: HOSPADM

## 2024-05-23 RX ADMIN — KETOROLAC TROMETHAMINE 30 MG: 30 INJECTION, SOLUTION INTRAMUSCULAR at 16:45

## 2024-05-23 RX ADMIN — SODIUM CHLORIDE 1000 ML: 9 INJECTION, SOLUTION INTRAVENOUS at 16:44

## 2024-05-23 ASSESSMENT — ENCOUNTER SYMPTOMS
NAUSEA: 1
DIARRHEA: 1
BACK PAIN: 0
VOMITING: 0
SHORTNESS OF BREATH: 0
RHINORRHEA: 0
COUGH: 0
ABDOMINAL PAIN: 1
SORE THROAT: 0

## 2024-05-23 ASSESSMENT — PAIN SCALES - GENERAL
PAINLEVEL_OUTOF10: 5
PAINLEVEL_OUTOF10: 5
PAINLEVEL_OUTOF10: 0

## 2024-05-23 ASSESSMENT — PAIN DESCRIPTION - LOCATION: LOCATION: ABDOMEN

## 2024-05-23 ASSESSMENT — PAIN DESCRIPTION - DESCRIPTORS: DESCRIPTORS: CRAMPING

## 2024-05-23 NOTE — ED TRIAGE NOTES
Pt to rm 41 per intake c/o generalized low ABD pain and low back pain that does not radiate around. Nausea, no emesis- diarrhea this morning but not since. Reports cramplike pains. States she was initially c/o food poisoning but now she's not sure because her  didn't get sick. Also states she is \"chelating\" with supplements for heavy metals in her body but she is currently on a 'break'.

## 2024-05-23 NOTE — ED NOTES
Pt refusing IV contrast. States it made her feel bad for weeks after she had it one time- states 'almost killed me'- but not actually allergic. States the supplements she takes for chelating says she shouldn't do the contrast. Provider aware.

## 2024-05-23 NOTE — ED PROVIDER NOTES
[facility] EMERGENCY DEPT      Pt Name: Sun Boudreaux  MRN: [id number]  Birthdate 1975  Date of evaluation: 5/23/2024  Provider: Eliane Cast PA-C    CHIEF COMPLAINT       Chief Complaint   Patient presents with    Abdominal Pain    Back Pain       Nurses Notes reviewed and I agree except as noted in the HPI.      HISTORY OF PRESENT ILLNESS    Sun Boudreaux is a 48 y.o. female who presents through the lobby from home for abdominal pain.  Patient reports last night she developed nausea and generalized abdominal cramping.  The abdominal pain radiates to the back.  She had eaten at a banquet and thought she had food poisoning however her  who ate there as well is not sick.  Patient started having diarrhea this morning and her pain worsened prompting her to come to the ER.  Patient affirms loss of appetite, subjective fever and chills but denies urinary frequency, dysuria, constipation, vomiting, chest pain, dyspnea, dizziness, or other complaints.  Patient denies possibility of pregnancy and has history of cholecystectomy.    Location/Symptom: Generalized abdominal pain  Timing/Onset: Last night  Context/Setting: Originally thought she had food poisoning  Quality: Cramping  Duration: Constant  Modifying Factors: Worse with movement  Severity: Moderate    REVIEW OF SYSTEMS     Review of Systems   Constitutional:  Positive for appetite change, chills and fever.   HENT:  Negative for congestion, ear pain, rhinorrhea and sore throat.    Respiratory:  Negative for cough and shortness of breath.    Cardiovascular:  Negative for chest pain.   Gastrointestinal:  Positive for abdominal pain, diarrhea and nausea. Negative for vomiting.   Genitourinary:  Negative for difficulty urinating, dysuria, flank pain and frequency.   Musculoskeletal:  Negative for back pain and gait problem.   Skin:  Negative for rash.   Neurological:  Negative for dizziness, weakness and light-headedness.   Psychiatric/Behavioral:

## 2024-05-24 LAB
BILIRUB UR QL STRIP.AUTO: NEGATIVE
CHARACTER UR: CLEAR
COLOR: YELLOW
GLUCOSE UR QL STRIP.AUTO: NEGATIVE MG/DL
HGB UR QL STRIP.AUTO: NEGATIVE
KETONES UR QL STRIP.AUTO: NEGATIVE
NITRITE UR QL STRIP: NEGATIVE
PH UR STRIP.AUTO: 7 [PH] (ref 5–9)
PROT UR STRIP.AUTO-MCNC: NEGATIVE MG/DL
SP GR UR REFRACT.AUTO: < 1.005 (ref 1–1.03)
UROBILINOGEN, URINE: 0.2 EU/DL (ref 0–1)
WBC #/AREA URNS HPF: NEGATIVE /[HPF]

## 2024-05-24 NOTE — CONSULTS
[address] [address]                              CONSULTATION      PATIENT NAME: CATA WELLER                 : 1975  MED REC NO: [id number]                       ROOM: 41  ACCOUNT NO: [id number]                       ADMIT DATE: 2024  PROVIDER: Richard Spencer MD    ER CONSULTATION    CONSULT DATE:  2024      CHIEF COMPLAINT:  Abdominal pain, possible appendicitis.    HISTORY OF PRESENT ILLNESS:  The patient is a complicated 48-year-old female, although on first glance, she looks quite healthy, but apparently had contracted Lyme disease roughly 30 years ago and it began as problem with multiple complaints.  She apparently had been bitten by a tick, saw several doctors throughout [place] at that time, eventually was diagnosed with Lyme disease.  She states basically since then she has been unwell with multiple somatic complaints with chronic fatigue, irritable bowel syndrome, multiple food allergies, fibromyalgia.  Nonetheless, she had onset about 1:30 this morning, rather severe abdominal pain and cramping.  She presented to the emergency room for evaluation.  Eliane Cast CNP, in the emergency room had no localization of pain, but did order a CT scan.  The CT scan has been read as possibly a dilated appendix in the pelvis, but the patient could not be given IV contrast because apparently of some possible allergy or reaction that she has had plus she is on a lot of chelating agents and she has been told she should not have IV dye.  Nonetheless, again, the CT was read as a 1.1 cm intraluminal lesion with hyperdensities and calcifications, contiguous with the cecum just inferior to the terminal ileum, extending inferiorly into the adnexal region.  It was felt to possibly represent a dilated appendix, but again there was no notice of any fatty stranding or suggesting inflammation.  However, Surgery was

## 2024-06-02 LAB — CYTOLOGY THIN PREP PAP: NORMAL

## 2025-03-17 ENCOUNTER — OFFICE VISIT (OUTPATIENT)
Dept: FAMILY MEDICINE CLINIC | Age: 50
End: 2025-03-17
Payer: COMMERCIAL

## 2025-03-17 VITALS
DIASTOLIC BLOOD PRESSURE: 70 MMHG | WEIGHT: 150.25 LBS | HEART RATE: 72 BPM | HEIGHT: 69 IN | SYSTOLIC BLOOD PRESSURE: 110 MMHG | RESPIRATION RATE: 18 BRPM | TEMPERATURE: 97.3 F | BODY MASS INDEX: 22.25 KG/M2 | OXYGEN SATURATION: 99 %

## 2025-03-17 DIAGNOSIS — H10.33 ACUTE BACTERIAL CONJUNCTIVITIS OF BOTH EYES: Primary | ICD-10-CM

## 2025-03-17 PROCEDURE — 99213 OFFICE O/P EST LOW 20 MIN: CPT | Performed by: NURSE PRACTITIONER

## 2025-03-17 RX ORDER — SODIUM, POTASSIUM,MAG SULFATES 17.5-3.13G
SOLUTION, RECONSTITUTED, ORAL ORAL
COMMUNITY
Start: 2025-03-17

## 2025-03-17 RX ORDER — OFLOXACIN 3 MG/ML
1 SOLUTION/ DROPS OPHTHALMIC 4 TIMES DAILY
Qty: 10 ML | Refills: 0 | Status: SHIPPED | OUTPATIENT
Start: 2025-03-17 | End: 2025-03-27

## 2025-03-17 RX ORDER — ZINC PICOLINATE
30 POWDER (GRAM) MISCELLANEOUS DAILY
COMMUNITY

## 2025-03-17 SDOH — ECONOMIC STABILITY: FOOD INSECURITY: WITHIN THE PAST 12 MONTHS, THE FOOD YOU BOUGHT JUST DIDN'T LAST AND YOU DIDN'T HAVE MONEY TO GET MORE.: NEVER TRUE

## 2025-03-17 SDOH — ECONOMIC STABILITY: FOOD INSECURITY: WITHIN THE PAST 12 MONTHS, YOU WORRIED THAT YOUR FOOD WOULD RUN OUT BEFORE YOU GOT MONEY TO BUY MORE.: NEVER TRUE

## 2025-03-17 ASSESSMENT — ENCOUNTER SYMPTOMS
EYE REDNESS: 1
SORE THROAT: 0
ABDOMINAL DISTENTION: 0
SINUS PRESSURE: 0
CHEST TIGHTNESS: 0
EYE DISCHARGE: 1
COUGH: 0
ABDOMINAL PAIN: 0
VOMITING: 0
SHORTNESS OF BREATH: 0
COLOR CHANGE: 0
CONSTIPATION: 0
DIARRHEA: 0
WHEEZING: 0
BACK PAIN: 0
STRIDOR: 0
NAUSEA: 0

## 2025-03-17 ASSESSMENT — PATIENT HEALTH QUESTIONNAIRE - PHQ9
SUM OF ALL RESPONSES TO PHQ QUESTIONS 1-9: 0
2. FEELING DOWN, DEPRESSED OR HOPELESS: NOT AT ALL
1. LITTLE INTEREST OR PLEASURE IN DOING THINGS: NOT AT ALL
SUM OF ALL RESPONSES TO PHQ QUESTIONS 1-9: 0

## 2025-03-17 NOTE — PROGRESS NOTES
Sun Boudreaux (:  1975) is a 49 y.o. female, Established patient, here for evaluation of the following chief complaint(s):  Conjunctivitis (Bilateral eyes. Noticed it on Friday. ) and Ear Pain (Right ear. Now feels clogges)         Assessment & Plan  1. Conjunctivitis.  The etiology of the conjunctivitis could be either viral or bacterial, given the current prevalence of COVID-19 and influenza viruses. She was advised to maintain frequent hand washing to prevent further spread of the infection. A prescription for ofloxacin eyedrops, to be administered as 1 drop in each eye 4 times daily, was provided. The prescription was sent to [facility].    2. Right earache.  The presence of fluid behind the ear is likely due to congestion, but there is no evidence of infection. She was recommended to take over-the-counter Zyrtec to alleviate the congestion and subsequently reduce the fluid accumulation in the ear.    3. Cough.  The cough is likely viral in nature and should resolve on its own.    Results    1. Acute bacterial conjunctivitis of both eyes    Return if symptoms worsen or fail to improve.       Subjective   History of Present Illness  The patient presents for evaluation of pink eye, right earache, and cough.    She is a  and has been exposed to children with various ailments, including 2 currently being treated for conjunctivitis. She suspects she may have contracted the same condition, as she has been experiencing symptoms since Friday. Her symptoms include ocular discharge and matting, particularly at night, which causes difficulty in opening her eyes upon waking. She reports that her current symptoms are less severe than previous episodes of conjunctivitis. She has been practicing good hand hygiene, resulting in dry and raw skin on her hands. She has not been taking Zyrtec. She has been using over-the-counter pink eye relief drops from [facility] and has attempted some natural

## 2025-08-29 ENCOUNTER — TELEPHONE (OUTPATIENT)
Dept: FAMILY MEDICINE CLINIC | Age: 50
End: 2025-08-29